# Patient Record
Sex: FEMALE | Race: WHITE | NOT HISPANIC OR LATINO | Employment: PART TIME | URBAN - METROPOLITAN AREA
[De-identification: names, ages, dates, MRNs, and addresses within clinical notes are randomized per-mention and may not be internally consistent; named-entity substitution may affect disease eponyms.]

---

## 2023-03-27 ENCOUNTER — LAB VISIT (OUTPATIENT)
Dept: LAB | Facility: HOSPITAL | Age: 28
End: 2023-03-27
Attending: OBSTETRICS & GYNECOLOGY
Payer: COMMERCIAL

## 2023-03-27 ENCOUNTER — ANESTHESIA EVENT (OUTPATIENT)
Dept: SURGERY | Facility: HOSPITAL | Age: 28
End: 2023-03-27
Payer: COMMERCIAL

## 2023-03-27 DIAGNOSIS — Z01.818 OTHER SPECIFIED PRE-OPERATIVE EXAMINATION: Primary | ICD-10-CM

## 2023-03-27 DIAGNOSIS — Z01.818 OTHER SPECIFIED PRE-OPERATIVE EXAMINATION: ICD-10-CM

## 2023-03-27 LAB
BASOPHILS # BLD AUTO: 0.07 X10(3)/MCL (ref 0–0.2)
BASOPHILS NFR BLD AUTO: 1.3 %
EOSINOPHIL # BLD AUTO: 0.1 X10(3)/MCL (ref 0–0.9)
EOSINOPHIL NFR BLD AUTO: 1.8 %
ERYTHROCYTE [DISTWIDTH] IN BLOOD BY AUTOMATED COUNT: 12.2 % (ref 11.5–17)
HCT VFR BLD AUTO: 39.8 % (ref 37–47)
HGB BLD-MCNC: 12.9 G/DL (ref 12–16)
IMM GRANULOCYTES # BLD AUTO: 0.01 X10(3)/MCL (ref 0–0.04)
IMM GRANULOCYTES NFR BLD AUTO: 0.2 %
LYMPHOCYTES # BLD AUTO: 1.97 X10(3)/MCL (ref 0.6–4.6)
LYMPHOCYTES NFR BLD AUTO: 35.4 %
MCH RBC QN AUTO: 29.5 PG (ref 27–31)
MCHC RBC AUTO-ENTMCNC: 32.4 G/DL (ref 33–36)
MCV RBC AUTO: 90.9 FL (ref 80–94)
MONOCYTES # BLD AUTO: 0.61 X10(3)/MCL (ref 0.1–1.3)
MONOCYTES NFR BLD AUTO: 11 %
NEUTROPHILS # BLD AUTO: 2.81 X10(3)/MCL (ref 2.1–9.2)
NEUTROPHILS NFR BLD AUTO: 50.3 %
NRBC BLD AUTO-RTO: 0 %
PLATELET # BLD AUTO: 277 X10(3)/MCL (ref 130–400)
PMV BLD AUTO: 10.4 FL (ref 7.4–10.4)
RBC # BLD AUTO: 4.38 X10(6)/MCL (ref 4.2–5.4)
WBC # SPEC AUTO: 5.6 X10(3)/MCL (ref 4.5–11.5)

## 2023-03-27 PROCEDURE — 36415 COLL VENOUS BLD VENIPUNCTURE: CPT

## 2023-03-27 PROCEDURE — 85025 COMPLETE CBC W/AUTO DIFF WBC: CPT

## 2023-03-27 RX ORDER — LEVOTHYROXINE SODIUM 100 UG/1
150 TABLET ORAL DAILY
COMMUNITY

## 2023-03-27 NOTE — ANESTHESIA PREPROCEDURE EVALUATION
03/27/2023  Carina Tamayo is a 27 y.o., female presents as an outpatient for D&C (missed AB).  Seven weeks EGA    Last 3 sets of Vitals    Vitals - 1 value per visit 3/27/2023 3/28/2023   SYSTOLIC - 112   DIASTOLIC - 77   Pulse - 83   Temp - 98   Resp - 16   SPO2 - 100   Weight (lb) 134 -   Weight (kg) 60.782 -   Height 63 -   BMI (Calculated) 23.7 -         Lab Results   Component Value Date    WBC 5.6 03/27/2023    HGB 12.9 03/27/2023    HCT 39.8 03/27/2023    MCV 90.9 03/27/2023     03/27/2023   Pre-op Assessment    I have reviewed the Patient Summary Reports.    I have reviewed the NPO Status.   I have reviewed the Medications.     Review of Systems  Anesthesia Hx:   Denies Personal Hx of Anesthesia complications.   Social:  Non-Smoker    Cardiovascular:  Functional Capacity good / => 4 METS        Physical Exam  General: Well nourished, Cooperative, Alert and Oriented    Airway:  Mallampati: II   Mouth Opening: Normal  TM Distance: Normal  Tongue: Normal  Neck ROM: Normal ROM    Dental:  Intact    Chest/Lungs:  Clear to auscultation, Normal Respiratory Rate    Heart:  Rate: Normal  Rhythm: Regular Rhythm        Anesthesia Plan  Type of Anesthesia, risks & benefits discussed:    Anesthesia Type: Gen Supraglottic Airway  Intra-op Monitoring Plan: Standard ASA Monitors  Post Op Pain Control Plan: multimodal analgesia and IV/PO Opioids PRN  Induction:  IV  Airway Plan: Direct  Informed Consent: Informed consent signed with the Patient and all parties understand the risks and agree with anesthesia plan.  All questions answered.   ASA Score: 1  Day of Surgery Review of History & Physical: H&P Update referred to the surgeon/provider.    Ready For Surgery From Anesthesia Perspective.     .

## 2023-03-28 ENCOUNTER — HOSPITAL ENCOUNTER (OUTPATIENT)
Facility: HOSPITAL | Age: 28
Discharge: HOME | End: 2023-03-28
Attending: OBSTETRICS & GYNECOLOGY | Admitting: OBSTETRICS & GYNECOLOGY
Payer: COMMERCIAL

## 2023-03-28 ENCOUNTER — ANESTHESIA (OUTPATIENT)
Dept: SURGERY | Facility: HOSPITAL | Age: 28
End: 2023-03-28
Payer: COMMERCIAL

## 2023-03-28 DIAGNOSIS — O02.1 MISSED ABORTION: ICD-10-CM

## 2023-03-28 LAB
GROUP & RH: NORMAL
INDIRECT COOMBS GEL: NORMAL
SPECIMEN OUTDATE: NORMAL

## 2023-03-28 PROCEDURE — 71000016 HC POSTOP RECOV ADDL HR: Performed by: OBSTETRICS & GYNECOLOGY

## 2023-03-28 PROCEDURE — 25000003 PHARM REV CODE 250: Performed by: ANESTHESIOLOGY

## 2023-03-28 PROCEDURE — 25000003 PHARM REV CODE 250

## 2023-03-28 PROCEDURE — 63600175 PHARM REV CODE 636 W HCPCS

## 2023-03-28 PROCEDURE — 37000008 HC ANESTHESIA 1ST 15 MINUTES: Performed by: OBSTETRICS & GYNECOLOGY

## 2023-03-28 PROCEDURE — 63600175 PHARM REV CODE 636 W HCPCS: Performed by: ANESTHESIOLOGY

## 2023-03-28 PROCEDURE — 86900 BLOOD TYPING SEROLOGIC ABO: CPT | Performed by: OBSTETRICS & GYNECOLOGY

## 2023-03-28 PROCEDURE — 36000704 HC OR TIME LEV I 1ST 15 MIN: Performed by: OBSTETRICS & GYNECOLOGY

## 2023-03-28 PROCEDURE — 36000705 HC OR TIME LEV I EA ADD 15 MIN: Performed by: OBSTETRICS & GYNECOLOGY

## 2023-03-28 PROCEDURE — 25000003 PHARM REV CODE 250: Performed by: OBSTETRICS & GYNECOLOGY

## 2023-03-28 PROCEDURE — 71000015 HC POSTOP RECOV 1ST HR: Performed by: OBSTETRICS & GYNECOLOGY

## 2023-03-28 PROCEDURE — 63700000 PHARM REV CODE 250 ALT 637 W/O HCPCS: Performed by: OBSTETRICS & GYNECOLOGY

## 2023-03-28 PROCEDURE — 37000009 HC ANESTHESIA EA ADD 15 MINS: Performed by: OBSTETRICS & GYNECOLOGY

## 2023-03-28 PROCEDURE — 71000033 HC RECOVERY, INTIAL HOUR: Performed by: OBSTETRICS & GYNECOLOGY

## 2023-03-28 RX ORDER — MEPERIDINE HYDROCHLORIDE 25 MG/ML
12.5 INJECTION INTRAMUSCULAR; INTRAVENOUS; SUBCUTANEOUS EVERY 10 MIN PRN
Status: DISCONTINUED | OUTPATIENT
Start: 2023-03-28 | End: 2023-03-28

## 2023-03-28 RX ORDER — LORAZEPAM 2 MG/ML
0.25 INJECTION INTRAMUSCULAR ONCE AS NEEDED
Status: DISCONTINUED | OUTPATIENT
Start: 2023-03-28 | End: 2023-03-28

## 2023-03-28 RX ORDER — MIDAZOLAM HYDROCHLORIDE 1 MG/ML
2 INJECTION INTRAMUSCULAR; INTRAVENOUS ONCE AS NEEDED
Status: CANCELLED | OUTPATIENT
Start: 2023-03-28 | End: 2034-08-23

## 2023-03-28 RX ORDER — FENTANYL CITRATE 50 UG/ML
INJECTION, SOLUTION INTRAMUSCULAR; INTRAVENOUS
Status: DISCONTINUED | OUTPATIENT
Start: 2023-03-28 | End: 2023-03-28

## 2023-03-28 RX ORDER — MORPHINE SULFATE 4 MG/ML
3 INJECTION, SOLUTION INTRAMUSCULAR; INTRAVENOUS
Status: DISCONTINUED | OUTPATIENT
Start: 2023-03-28 | End: 2023-03-28 | Stop reason: HOSPADM

## 2023-03-28 RX ORDER — HYDROMORPHONE HYDROCHLORIDE 2 MG/ML
0.2 INJECTION, SOLUTION INTRAMUSCULAR; INTRAVENOUS; SUBCUTANEOUS EVERY 5 MIN PRN
Status: DISCONTINUED | OUTPATIENT
Start: 2023-03-28 | End: 2023-03-28

## 2023-03-28 RX ORDER — ACETAMINOPHEN 10 MG/ML
1000 INJECTION, SOLUTION INTRAVENOUS ONCE
Status: COMPLETED | OUTPATIENT
Start: 2023-03-28 | End: 2023-03-28

## 2023-03-28 RX ORDER — PROCHLORPERAZINE EDISYLATE 5 MG/ML
5 INJECTION INTRAMUSCULAR; INTRAVENOUS EVERY 6 HOURS PRN
Status: DISCONTINUED | OUTPATIENT
Start: 2023-03-28 | End: 2023-03-28 | Stop reason: HOSPADM

## 2023-03-28 RX ORDER — ONDANSETRON HYDROCHLORIDE 2 MG/ML
INJECTION, SOLUTION INTRAMUSCULAR; INTRAVENOUS
Status: DISCONTINUED | OUTPATIENT
Start: 2023-03-28 | End: 2023-03-28

## 2023-03-28 RX ORDER — PROCHLORPERAZINE EDISYLATE 5 MG/ML
5 INJECTION INTRAMUSCULAR; INTRAVENOUS EVERY 30 MIN PRN
Status: DISCONTINUED | OUTPATIENT
Start: 2023-03-28 | End: 2023-03-28

## 2023-03-28 RX ORDER — METRONIDAZOLE 500 MG/1
1000 TABLET ORAL ONCE
Status: COMPLETED | OUTPATIENT
Start: 2023-03-28 | End: 2023-03-28

## 2023-03-28 RX ORDER — SCOLOPAMINE TRANSDERMAL SYSTEM 1 MG/1
PATCH, EXTENDED RELEASE TRANSDERMAL
Status: COMPLETED
Start: 2023-03-28 | End: 2023-03-28

## 2023-03-28 RX ORDER — HYDROCODONE BITARTRATE AND ACETAMINOPHEN 5; 325 MG/1; MG/1
1 TABLET ORAL EVERY 4 HOURS PRN
Status: DISCONTINUED | OUTPATIENT
Start: 2023-03-28 | End: 2023-03-28 | Stop reason: HOSPADM

## 2023-03-28 RX ORDER — CELECOXIB 200 MG/1
200 CAPSULE ORAL ONCE
Status: COMPLETED | OUTPATIENT
Start: 2023-03-28 | End: 2023-03-28

## 2023-03-28 RX ORDER — IPRATROPIUM BROMIDE AND ALBUTEROL SULFATE 2.5; .5 MG/3ML; MG/3ML
3 SOLUTION RESPIRATORY (INHALATION)
Status: DISCONTINUED | OUTPATIENT
Start: 2023-03-28 | End: 2023-03-28

## 2023-03-28 RX ORDER — DEXMEDETOMIDINE HYDROCHLORIDE 100 UG/ML
INJECTION, SOLUTION INTRAVENOUS
Status: DISCONTINUED | OUTPATIENT
Start: 2023-03-28 | End: 2023-03-28

## 2023-03-28 RX ORDER — PROPOFOL 10 MG/ML
VIAL (ML) INTRAVENOUS
Status: DISCONTINUED | OUTPATIENT
Start: 2023-03-28 | End: 2023-03-28

## 2023-03-28 RX ORDER — IBUPROFEN 600 MG/1
600 TABLET ORAL EVERY 6 HOURS PRN
Status: DISCONTINUED | OUTPATIENT
Start: 2023-03-28 | End: 2023-03-28 | Stop reason: HOSPADM

## 2023-03-28 RX ORDER — ONDANSETRON 4 MG/1
8 TABLET, ORALLY DISINTEGRATING ORAL EVERY 6 HOURS PRN
Status: CANCELLED | OUTPATIENT
Start: 2023-03-28

## 2023-03-28 RX ORDER — DIPHENHYDRAMINE HYDROCHLORIDE 50 MG/ML
25 INJECTION INTRAMUSCULAR; INTRAVENOUS EVERY 4 HOURS PRN
Status: DISCONTINUED | OUTPATIENT
Start: 2023-03-28 | End: 2023-03-28 | Stop reason: HOSPADM

## 2023-03-28 RX ORDER — SODIUM CHLORIDE, SODIUM GLUCONATE, SODIUM ACETATE, POTASSIUM CHLORIDE AND MAGNESIUM CHLORIDE 30; 37; 368; 526; 502 MG/100ML; MG/100ML; MG/100ML; MG/100ML; MG/100ML
INJECTION, SOLUTION INTRAVENOUS CONTINUOUS
Status: CANCELLED | OUTPATIENT
Start: 2023-03-28 | End: 2023-04-27

## 2023-03-28 RX ORDER — DEXAMETHASONE SODIUM PHOSPHATE 4 MG/ML
INJECTION, SOLUTION INTRA-ARTICULAR; INTRALESIONAL; INTRAMUSCULAR; INTRAVENOUS; SOFT TISSUE
Status: DISCONTINUED | OUTPATIENT
Start: 2023-03-28 | End: 2023-03-28

## 2023-03-28 RX ORDER — ONDANSETRON 4 MG/1
8 TABLET, ORALLY DISINTEGRATING ORAL EVERY 8 HOURS PRN
Status: DISCONTINUED | OUTPATIENT
Start: 2023-03-28 | End: 2023-03-28 | Stop reason: HOSPADM

## 2023-03-28 RX ORDER — GABAPENTIN 300 MG/1
600 CAPSULE ORAL ONCE
Status: COMPLETED | OUTPATIENT
Start: 2023-03-28 | End: 2023-03-28

## 2023-03-28 RX ORDER — SODIUM CHLORIDE, SODIUM LACTATE, POTASSIUM CHLORIDE, CALCIUM CHLORIDE 600; 310; 30; 20 MG/100ML; MG/100ML; MG/100ML; MG/100ML
INJECTION, SOLUTION INTRAVENOUS CONTINUOUS
Status: DISCONTINUED | OUTPATIENT
Start: 2023-03-28 | End: 2023-03-28

## 2023-03-28 RX ORDER — LIDOCAINE HYDROCHLORIDE 10 MG/ML
INJECTION, SOLUTION EPIDURAL; INFILTRATION; INTRACAUDAL; PERINEURAL
Status: DISCONTINUED | OUTPATIENT
Start: 2023-03-28 | End: 2023-03-28

## 2023-03-28 RX ORDER — HYDROMORPHONE HYDROCHLORIDE 2 MG/ML
0.4 INJECTION, SOLUTION INTRAMUSCULAR; INTRAVENOUS; SUBCUTANEOUS EVERY 5 MIN PRN
Status: DISCONTINUED | OUTPATIENT
Start: 2023-03-28 | End: 2023-03-28

## 2023-03-28 RX ORDER — LIDOCAINE HYDROCHLORIDE 10 MG/ML
1 INJECTION, SOLUTION EPIDURAL; INFILTRATION; INTRACAUDAL; PERINEURAL ONCE
Status: CANCELLED | OUTPATIENT
Start: 2023-03-28 | End: 2023-03-28

## 2023-03-28 RX ORDER — ONDANSETRON 2 MG/ML
4 INJECTION INTRAMUSCULAR; INTRAVENOUS DAILY PRN
Status: DISCONTINUED | OUTPATIENT
Start: 2023-03-28 | End: 2023-03-28

## 2023-03-28 RX ORDER — DIPHENHYDRAMINE HCL 25 MG
25 CAPSULE ORAL EVERY 4 HOURS PRN
Status: DISCONTINUED | OUTPATIENT
Start: 2023-03-28 | End: 2023-03-28 | Stop reason: HOSPADM

## 2023-03-28 RX ORDER — MIDAZOLAM HYDROCHLORIDE 1 MG/ML
INJECTION INTRAMUSCULAR; INTRAVENOUS
Status: COMPLETED
Start: 2023-03-28 | End: 2023-03-28

## 2023-03-28 RX ORDER — AZITHROMYCIN 250 MG/1
500 TABLET, FILM COATED ORAL ONCE
Status: COMPLETED | OUTPATIENT
Start: 2023-03-28 | End: 2023-03-28

## 2023-03-28 RX ADMIN — DEXAMETHASONE SODIUM PHOSPHATE 8 MG: 4 INJECTION, SOLUTION INTRA-ARTICULAR; INTRALESIONAL; INTRAMUSCULAR; INTRAVENOUS; SOFT TISSUE at 07:03

## 2023-03-28 RX ADMIN — SODIUM CHLORIDE, POTASSIUM CHLORIDE, SODIUM LACTATE AND CALCIUM CHLORIDE: 600; 310; 30; 20 INJECTION, SOLUTION INTRAVENOUS at 06:03

## 2023-03-28 RX ADMIN — SODIUM CHLORIDE, POTASSIUM CHLORIDE, SODIUM LACTATE AND CALCIUM CHLORIDE: 600; 310; 30; 20 INJECTION, SOLUTION INTRAVENOUS at 08:03

## 2023-03-28 RX ADMIN — ONDANSETRON 4 MG: 2 INJECTION INTRAMUSCULAR; INTRAVENOUS at 07:03

## 2023-03-28 RX ADMIN — CELECOXIB 200 MG: 200 CAPSULE ORAL at 06:03

## 2023-03-28 RX ADMIN — LIDOCAINE HYDROCHLORIDE 50 MG: 10 INJECTION, SOLUTION EPIDURAL; INFILTRATION; INTRACAUDAL; PERINEURAL at 07:03

## 2023-03-28 RX ADMIN — SCOPOLAMINE 1 PATCH: 1 PATCH TRANSDERMAL at 06:03

## 2023-03-28 RX ADMIN — PROPOFOL 150 MG: 10 INJECTION, EMULSION INTRAVENOUS at 07:03

## 2023-03-28 RX ADMIN — FENTANYL CITRATE 50 MCG: 50 INJECTION, SOLUTION INTRAMUSCULAR; INTRAVENOUS at 07:03

## 2023-03-28 RX ADMIN — GABAPENTIN 600 MG: 300 CAPSULE ORAL at 06:03

## 2023-03-28 RX ADMIN — FENTANYL CITRATE 25 MCG: 50 INJECTION, SOLUTION INTRAMUSCULAR; INTRAVENOUS at 07:03

## 2023-03-28 RX ADMIN — ACETAMINOPHEN 1000 MG: 10 INJECTION, SOLUTION INTRAVENOUS at 06:03

## 2023-03-28 RX ADMIN — AZITHROMYCIN MONOHYDRATE 500 MG: 250 TABLET ORAL at 07:03

## 2023-03-28 RX ADMIN — MIDAZOLAM HYDROCHLORIDE 2 MG: 1 INJECTION, SOLUTION INTRAMUSCULAR; INTRAVENOUS at 07:03

## 2023-03-28 RX ADMIN — DEXMEDETOMIDINE 6 MCG: 200 INJECTION, SOLUTION INTRAVENOUS at 07:03

## 2023-03-28 RX ADMIN — METRONIDAZOLE 1000 MG: 500 TABLET ORAL at 07:03

## 2023-03-28 NOTE — TRANSFER OF CARE
Anesthesia Transfer of Care Note    Patient: Carina Tamayo    Procedure(s) Performed: Procedure(s) (LRB):  DILATION AND CURETTAGE, UTERUS, USING SUCTION (N/A)    Patient location: PACU    Anesthesia Type: general    Transport from OR: Transported from OR on room air with adequate spontaneous ventilation    Post pain: adequate analgesia    Post assessment: no apparent anesthetic complications and tolerated procedure well    Post vital signs: stable    Level of consciousness: responds to stimulation    Nausea/Vomiting: no nausea/vomiting    Complications: none    Transfer of care protocol was followed      Last vitals:

## 2023-03-28 NOTE — OP NOTE
GYN OPERATIVE REPORT    Date: 3/28/23    Preop Diagnosis: Missed     Postop Diagnosis: Same    Procedure: Suction D&C    Surgeon: Rebekah العراقي MD    Anesthesia: General     IVF: 400ml    EBL: 200ml    Urine output: 300ml    Specimen:Products of conception    Complications:None    Findings:  Exam revealed closed, nulliparous cervix.  Uterus 9 week size mobile uterus that sounded to 10cm.     Procedure: The patient was taken to the OR, general anesthesia was obtained without difficulty.  She was placed in the dorsal lithotomy position with Otoniel stirrups.  Exam under anesthesia revealed the above mentioned findings.  She was then prepped and draped in the normal sterile fashion.  A speculum was placed in the vagina.  A single tooth tenaculum was used to grasp the anterior lip of the cervix. Hegar dilators were used to sequentially dilate the cervix to accommodate an 10 mm then 8 mm suction curet. Suction curet was advanced to fundus and suction activated with POC noted in tubing. The uterus was curetted in a clockwise fashion until a gritty feeling was noted and suction curettage was again performed. The specimen was sent to pathology.  The tenaculum was removed from the cervix and the puncture site was noted to be hemostatic.  No active bleeding was noted.  The patient tolerated the procedure well.  All instrument and sponge counts were correct times two.  The patient was awakened from general anesthesia and taken to the recovery room in stable condition.

## 2023-03-28 NOTE — H&P
Pre-Operative History and Physical   Obstetrics and Gynecology  Date: 3/28/23    Carina Tamayo is a 27 y.o. female  for preop examination.  She is scheduled for a suction d&c for missed AB at 8 wga on 3/28/23.    ROS:  GENERAL: Denies weight gain or weight loss. Feeling well overall.   SKIN: Denies rash or lesions.   HEAD: Denies head injury or headache.   NODES: Denies enlarged lymph nodes.   CHEST: Denies chest pain or shortness of breath.   CARDIOVASCULAR: Denies palpitations or left sided chest pain.   ABDOMEN: No abdominal pain, constipation, diarrhea, nausea, vomiting or rectal bleeding.   URINARY: No frequency, dysuria, hematuria, or burning on urination.  REPRODUCTIVE: See HPI.   BREASTS: denies pain, lumps, or nipple discharge.   HEMATOLOGIC: No easy bruisability or excessive bleeding with the exception of menstrual cycles.  MUSCULOSKELETAL: Denies joint pain or swelling.   NEUROLOGIC: Denies syncope or weakness.   PSYCHIATRIC: Denies depression, anxiety or mood swings.    Past Medical History:   Diagnosis Date    Disorder of thyroid, unspecified     Missed       Past Surgical History:   Procedure Laterality Date    THYROIDECTOMY      TUMOR EXCISION      removed from thyroid     Family History   Problem Relation Age of Onset    No Known Problems Mother     No Known Problems Father      Review of patient's allergies indicates:   Allergen Reactions    Doxycycline Rash       Current Facility-Administered Medications:     albuterol-ipratropium 2.5 mg-0.5 mg/3 mL nebulizer solution 3 mL, 3 mL, Nebulization, Q1H PRN, Bentley Warren Jr., MD    HYDROmorphone (PF) injection 0.2 mg, 0.2 mg, Intravenous, Q5 Min PRN, Bentley Warren Jr., MD    HYDROmorphone (PF) injection 0.4 mg, 0.4 mg, Intravenous, Q5 Min PRN, Bentley Warren Jr., MD    lactated ringers infusion, , Intravenous, Continuous, Bentley Warren Jr., MD, Stopped at 23 0742    LORazepam injection 0.25 mg, 0.25 mg, Intravenous, Once PRN,  Bentley Warren Jr., MD    meperidine (PF) injection 12.5 mg, 12.5 mg, Intravenous, Q10 Min PRN, Bentley Warren Jr., MD    ondansetron injection 4 mg, 4 mg, Intravenous, Daily PRN, Bentley Warren Jr., MD    prochlorperazine injection Soln 5 mg, 5 mg, Intravenous, Q30 Min PRN, Bentley Warren Jr., MD    Facility-Administered Medications Ordered in Other Encounters:     dexAMETHasone injection, , Intravenous, PRN, Domonique M Neatherlin, CRNA, 8 mg at 03/28/23 0725    dexmedeTOMIDine injection, , Intravenous, PRN, Domonique M Neatherlin, CRNA, 6 mcg at 03/28/23 0735    fentaNYL injection, , Intravenous, PRN, Domonique M Neatherlin, CRNA, 50 mcg at 03/28/23 0735    LIDOcaine (PF) 10 mg/ml (1%) injection, , Intravenous, PRN, Domonique M Neatherlin, CRNA, 50 mg at 03/28/23 0718    ondansetron HCl (PF) injection, , Intravenous, PRN, Domonique M Neatherlin, CRNA, 4 mg at 03/28/23 0725    propofol (DIPRIVAN) 10 mg/mL infusion, , Intravenous, PRN, Domonique M Neatherlin, CRNA, 150 mg at 03/28/23 0718  Outpatient Medications Marked as Taking for the 3/28/23 encounter (Hospital Encounter)   Medication Sig Dispense Refill    levothyroxine (SYNTHROID) 100 MCG tablet Take 0.175 mcg by mouth before breakfast.       Social History     Tobacco Use    Smoking status: Never    Smokeless tobacco: Never   Substance Use Topics    Alcohol use: Not Currently    Drug use: Never         Vitals:    03/28/23 0543   BP: 112/77   Pulse: 83   Resp: 16   Temp: 98 °F (36.7 °C)     General Appearance: no acute distress, alert and oriented   Cardiovascular Exam: regular rate and rhythm  Gastrointestinal Exam: clear to auscultation bilaterally    Rh+    Assessment: Missed AB    Plan:   To OR for suction d&c  Flagyl and Azithro due to Doxy allergy  I have discussed the risks, benefits, indications, and alternatives of the procedure in detail.  The patient verbalizes her understanding.  All questions answered.  Consents signed.  The patient agrees to proceed to proceed as  planned.

## 2023-03-28 NOTE — DISCHARGE SUMMARY
[facility] Surgical - Periop Services  Discharge Summary  OBGYN    Admission date: 3/28/2023  Discharge date: 2023    Admit Dx:      Patient Active Problem List   Diagnosis    Missed      Discharge Dx:    Patient Active Problem List   Diagnosis    Missed        Attending Physician: Rebekah العراقي   Discharge Provider: Rebekah العراقي    Procedures Performed: Procedure(s) (LRB):  DILATION AND CURETTAGE, UTERUS, USING SUCTION (N/A)    Hospital Course: Patient was admitted on 3/28/2023 to undergo Suction D&C secondary to missed .  Procedure was uncomplicated, for full details see operative report. Barring any unforseen incidents, patient will be discharged home when she is able to ambulate, void, and tolerate PO intake.    Consults: none    Significant Diagnostic Studies:   Lab Results   Component Value Date    WBC 5.6 2023    HGB 12.9 2023    HCT 39.8 2023    MCV 90.9 2023     2023     No image results found.  Rh+    Discharged Condition: stable    Disposition: Home    Follow Up: scheduled      Medications:  Current Discharge Medication List        CONTINUE these medications which have NOT CHANGED    Details   levothyroxine (SYNTHROID) 100 MCG tablet Take 0.175 mcg by mouth before breakfast.             No discharge procedures on file.

## 2023-03-28 NOTE — PLAN OF CARE
VSS, radha score  10 , pt arousable and ready for transfer to [facility] per Dr Warren.

## 2023-03-28 NOTE — ANESTHESIA POSTPROCEDURE EVALUATION
Anesthesia Post Evaluation    Patient: Carina Tamayo    Procedure(s) Performed: Procedure(s) (LRB):  DILATION AND CURETTAGE, UTERUS, USING SUCTION (N/A)    Final Anesthesia Type: general      Patient location during evaluation: floor  Patient participation: Yes- Able to Participate  Level of consciousness: awake and alert  Post-procedure vital signs: reviewed and stable  Pain management: adequate  Airway patency: patent    PONV status at discharge: No PONV  Anesthetic complications: no      Cardiovascular status: blood pressure returned to baseline  Respiratory status: spontaneous ventilation and room air  Hydration status: euvolemic  Follow-up not needed.          Vitals Value Taken Time   /71 03/28/23 0901   Temp 36.1 °C (97 °F) 03/28/23 0830   Pulse 62 03/28/23 0901   Resp 18 03/28/23 0830   SpO2 100 % 03/28/23 0901         Event Time   Out of Recovery 08:27:00         Pain/Rachele Score: Pain Rating Prior to Med Admin: 0 (3/28/2023  6:04 AM)  Rachele Score: 10 (3/28/2023  8:30 AM)

## 2023-03-28 NOTE — ANESTHESIA PROCEDURE NOTES
Intubation    Date/Time: 3/28/2023 7:19 AM  Performed by: Domonique Villar CRNA  Authorized by: Bentley Warren Jr., MD     Intubation:     Induction:  Intravenous    Intubated:  Postinduction    Mask Ventilation:  Easy mask    Attempts:  1    Attempted By:  CRNA    Difficult Airway Encountered?: No      Airway Device:  Supraglottic airway/LMA    Airway Device Size:  3.0    Style/Cuff Inflation:  Cuffed (inflated to minimal occlusive pressure)    Secured at:  The lips    Placement Verified By:  Capnometry    Complicating Factors:  None    Findings Post-Intubation:  BS equal bilateral

## 2023-03-29 VITALS
WEIGHT: 134 LBS | HEIGHT: 63 IN | RESPIRATION RATE: 18 BRPM | BODY MASS INDEX: 23.74 KG/M2 | OXYGEN SATURATION: 100 % | TEMPERATURE: 97 F | DIASTOLIC BLOOD PRESSURE: 71 MMHG | SYSTOLIC BLOOD PRESSURE: 103 MMHG | HEART RATE: 62 BPM

## 2023-03-29 LAB — PSYCHE PATHOLOGY RESULT: NORMAL

## 2023-08-07 ENCOUNTER — LAB VISIT (OUTPATIENT)
Dept: LAB | Facility: HOSPITAL | Age: 28
End: 2023-08-07
Attending: OBSTETRICS & GYNECOLOGY
Payer: COMMERCIAL

## 2023-08-07 DIAGNOSIS — Z01.818 OTHER SPECIFIED PRE-OPERATIVE EXAMINATION: Primary | ICD-10-CM

## 2023-08-07 DIAGNOSIS — Z01.818 OTHER SPECIFIED PRE-OPERATIVE EXAMINATION: ICD-10-CM

## 2023-08-07 LAB
BASOPHILS # BLD AUTO: 0.07 X10(3)/MCL
BASOPHILS NFR BLD AUTO: 1.3 %
EOSINOPHIL # BLD AUTO: 0.27 X10(3)/MCL (ref 0–0.9)
EOSINOPHIL NFR BLD AUTO: 5.2 %
ERYTHROCYTE [DISTWIDTH] IN BLOOD BY AUTOMATED COUNT: 12.6 % (ref 11.5–17)
HCT VFR BLD AUTO: 39.8 % (ref 37–47)
HGB BLD-MCNC: 12.8 G/DL (ref 12–16)
IMM GRANULOCYTES # BLD AUTO: 0.01 X10(3)/MCL (ref 0–0.04)
IMM GRANULOCYTES NFR BLD AUTO: 0.2 %
LYMPHOCYTES # BLD AUTO: 1.86 X10(3)/MCL (ref 0.6–4.6)
LYMPHOCYTES NFR BLD AUTO: 35.6 %
MCH RBC QN AUTO: 29.1 PG (ref 27–31)
MCHC RBC AUTO-ENTMCNC: 32.2 G/DL (ref 33–36)
MCV RBC AUTO: 90.5 FL (ref 80–94)
MONOCYTES # BLD AUTO: 0.6 X10(3)/MCL (ref 0.1–1.3)
MONOCYTES NFR BLD AUTO: 11.5 %
NEUTROPHILS # BLD AUTO: 2.41 X10(3)/MCL (ref 2.1–9.2)
NEUTROPHILS NFR BLD AUTO: 46.2 %
NRBC BLD AUTO-RTO: 0 %
PLATELET # BLD AUTO: 295 X10(3)/MCL (ref 130–400)
PMV BLD AUTO: 11.5 FL (ref 7.4–10.4)
RBC # BLD AUTO: 4.4 X10(6)/MCL (ref 4.2–5.4)
WBC # SPEC AUTO: 5.22 X10(3)/MCL (ref 4.5–11.5)

## 2023-08-07 PROCEDURE — 36415 COLL VENOUS BLD VENIPUNCTURE: CPT

## 2023-08-07 PROCEDURE — 85025 COMPLETE CBC W/AUTO DIFF WBC: CPT

## 2023-08-07 RX ORDER — LIDOCAINE HYDROCHLORIDE 10 MG/ML
1 INJECTION, SOLUTION EPIDURAL; INFILTRATION; INTRACAUDAL; PERINEURAL ONCE AS NEEDED
Status: CANCELLED | OUTPATIENT
Start: 2023-08-07 | End: 2035-01-03

## 2023-08-07 RX ORDER — SODIUM CHLORIDE 9 MG/ML
INJECTION, SOLUTION INTRAVENOUS CONTINUOUS
Status: CANCELLED | OUTPATIENT
Start: 2023-08-08

## 2023-08-07 NOTE — DISCHARGE INSTRUCTIONS
Patient Education       Discharge Instructions     What care is needed at home?   It is normal to have vaginal bleeding for a few weeks. You may use sanitary pads, but not tampons.  Do not douche, use tampons, or have sexual contact for 2 weeks or until release by your doctor.  You may shower in 24 hours.  No tub baths, swimming, or use a hot tub until release by your doctor.  Ask your doctor about when it is safe for you to go back to your normal activities like work, driving, and sex.  Take your medications as ordered.  What follow-up care is needed?   Be sure to keep your follow-up visit.  Will physical activity be limited?   Rest for the first few days after the procedure. Avoid strenuous activities like heavy lifting and hard workouts.  What problems could happen?   Harm to the cervix  Scarring of the lining of the uterus  Infection  Bleeding  A hole in the uterus from the tools used during the procedure  When do I need to call the doctor?   Signs of infection like a fever of 100.4°F (38°C) or higher, chills, pain with passing urine, or bad smelling drainage from the vagina.  Very bad belly pain  Heavy bleeding (more than 2 pads an hour or heavier than your normal cycle)  Upset stomach and throwing up  You are not feeling better in 2 to 3 days or you are feeling worse

## 2023-08-08 ENCOUNTER — ANESTHESIA EVENT (OUTPATIENT)
Dept: SURGERY | Facility: HOSPITAL | Age: 28
End: 2023-08-08
Payer: COMMERCIAL

## 2023-08-08 ENCOUNTER — HOSPITAL ENCOUNTER (OUTPATIENT)
Facility: HOSPITAL | Age: 28
Discharge: HOME | End: 2023-08-08
Attending: OBSTETRICS & GYNECOLOGY | Admitting: OBSTETRICS & GYNECOLOGY
Payer: COMMERCIAL

## 2023-08-08 ENCOUNTER — ANESTHESIA (OUTPATIENT)
Dept: SURGERY | Facility: HOSPITAL | Age: 28
End: 2023-08-08
Payer: COMMERCIAL

## 2023-08-08 DIAGNOSIS — N96 HABITUAL ABORTER: ICD-10-CM

## 2023-08-08 LAB
B-HCG UR QL: NEGATIVE
CTP QC/QA: YES

## 2023-08-08 PROCEDURE — D9220A PRA ANESTHESIA: Mod: ANES,,, | Performed by: ANESTHESIOLOGY

## 2023-08-08 PROCEDURE — 81025 URINE PREGNANCY TEST: CPT | Performed by: OBSTETRICS & GYNECOLOGY

## 2023-08-08 PROCEDURE — 63600175 PHARM REV CODE 636 W HCPCS: Performed by: ANESTHESIOLOGY

## 2023-08-08 PROCEDURE — 37000009 HC ANESTHESIA EA ADD 15 MINS: Performed by: OBSTETRICS & GYNECOLOGY

## 2023-08-08 PROCEDURE — 37000008 HC ANESTHESIA 1ST 15 MINUTES: Performed by: OBSTETRICS & GYNECOLOGY

## 2023-08-08 PROCEDURE — 63600175 PHARM REV CODE 636 W HCPCS: Performed by: NURSE ANESTHETIST, CERTIFIED REGISTERED

## 2023-08-08 PROCEDURE — D9220A PRA ANESTHESIA: ICD-10-PCS | Mod: CRNA,,, | Performed by: NURSE ANESTHETIST, CERTIFIED REGISTERED

## 2023-08-08 PROCEDURE — 36000707: Performed by: OBSTETRICS & GYNECOLOGY

## 2023-08-08 PROCEDURE — 71000015 HC POSTOP RECOV 1ST HR: Performed by: OBSTETRICS & GYNECOLOGY

## 2023-08-08 PROCEDURE — 25000003 PHARM REV CODE 250: Performed by: NURSE ANESTHETIST, CERTIFIED REGISTERED

## 2023-08-08 PROCEDURE — 25000003 PHARM REV CODE 250

## 2023-08-08 PROCEDURE — 63600175 PHARM REV CODE 636 W HCPCS: Performed by: OBSTETRICS & GYNECOLOGY

## 2023-08-08 PROCEDURE — 63600175 PHARM REV CODE 636 W HCPCS

## 2023-08-08 PROCEDURE — 36000706: Performed by: OBSTETRICS & GYNECOLOGY

## 2023-08-08 PROCEDURE — 71000033 HC RECOVERY, INTIAL HOUR: Performed by: OBSTETRICS & GYNECOLOGY

## 2023-08-08 PROCEDURE — 71000016 HC POSTOP RECOV ADDL HR: Performed by: OBSTETRICS & GYNECOLOGY

## 2023-08-08 PROCEDURE — D9220A PRA ANESTHESIA: Mod: CRNA,,, | Performed by: NURSE ANESTHETIST, CERTIFIED REGISTERED

## 2023-08-08 PROCEDURE — D9220A PRA ANESTHESIA: ICD-10-PCS | Mod: ANES,,, | Performed by: ANESTHESIOLOGY

## 2023-08-08 RX ORDER — METOCLOPRAMIDE HYDROCHLORIDE 5 MG/ML
INJECTION INTRAMUSCULAR; INTRAVENOUS
Status: COMPLETED
Start: 2023-08-08 | End: 2023-08-08

## 2023-08-08 RX ORDER — SODIUM CHLORIDE 9 MG/ML
INJECTION, SOLUTION INTRAVENOUS CONTINUOUS
Status: CANCELLED | OUTPATIENT
Start: 2023-08-08

## 2023-08-08 RX ORDER — FENTANYL CITRATE 50 UG/ML
INJECTION, SOLUTION INTRAMUSCULAR; INTRAVENOUS
Status: DISCONTINUED | OUTPATIENT
Start: 2023-08-08 | End: 2023-08-08

## 2023-08-08 RX ORDER — METHOCARBAMOL 100 MG/ML
1000 INJECTION, SOLUTION INTRAMUSCULAR; INTRAVENOUS ONCE
Status: COMPLETED | OUTPATIENT
Start: 2023-08-08 | End: 2023-08-08

## 2023-08-08 RX ORDER — MORPHINE SULFATE 4 MG/ML
3 INJECTION, SOLUTION INTRAMUSCULAR; INTRAVENOUS
Status: DISCONTINUED | OUTPATIENT
Start: 2023-08-08 | End: 2023-08-08 | Stop reason: HOSPADM

## 2023-08-08 RX ORDER — FAMOTIDINE 10 MG/ML
INJECTION INTRAVENOUS
Status: COMPLETED
Start: 2023-08-08 | End: 2023-08-08

## 2023-08-08 RX ORDER — MIDAZOLAM HYDROCHLORIDE 1 MG/ML
INJECTION INTRAMUSCULAR; INTRAVENOUS
Status: COMPLETED
Start: 2023-08-08 | End: 2023-08-08

## 2023-08-08 RX ORDER — LIDOCAINE HYDROCHLORIDE 10 MG/ML
INJECTION, SOLUTION EPIDURAL; INFILTRATION; INTRACAUDAL; PERINEURAL
Status: DISCONTINUED | OUTPATIENT
Start: 2023-08-08 | End: 2023-08-08

## 2023-08-08 RX ORDER — DIPHENHYDRAMINE HYDROCHLORIDE 50 MG/ML
25 INJECTION INTRAMUSCULAR; INTRAVENOUS EVERY 4 HOURS PRN
Status: DISCONTINUED | OUTPATIENT
Start: 2023-08-08 | End: 2023-08-08 | Stop reason: HOSPADM

## 2023-08-08 RX ORDER — ACETAMINOPHEN 500 MG
1000 TABLET ORAL ONCE
Status: COMPLETED | OUTPATIENT
Start: 2023-08-08 | End: 2023-08-08

## 2023-08-08 RX ORDER — HYDROCODONE BITARTRATE AND ACETAMINOPHEN 5; 325 MG/1; MG/1
1 TABLET ORAL
Status: DISCONTINUED | OUTPATIENT
Start: 2023-08-08 | End: 2023-08-08

## 2023-08-08 RX ORDER — IPRATROPIUM BROMIDE AND ALBUTEROL SULFATE 2.5; .5 MG/3ML; MG/3ML
3 SOLUTION RESPIRATORY (INHALATION) ONCE AS NEEDED
Status: DISCONTINUED | OUTPATIENT
Start: 2023-08-08 | End: 2023-08-08

## 2023-08-08 RX ORDER — ACETAMINOPHEN 500 MG
TABLET ORAL
Status: COMPLETED
Start: 2023-08-08 | End: 2023-08-08

## 2023-08-08 RX ORDER — ONDANSETRON 2 MG/ML
4 INJECTION INTRAMUSCULAR; INTRAVENOUS ONCE
Status: COMPLETED | OUTPATIENT
Start: 2023-08-08 | End: 2023-08-08

## 2023-08-08 RX ORDER — SILVER NITRATE 38.21; 12.74 MG/1; MG/1
STICK TOPICAL
Status: DISCONTINUED
Start: 2023-08-08 | End: 2023-08-08 | Stop reason: WASHOUT

## 2023-08-08 RX ORDER — DIPHENHYDRAMINE HYDROCHLORIDE 50 MG/ML
25 INJECTION INTRAMUSCULAR; INTRAVENOUS EVERY 6 HOURS PRN
Status: DISCONTINUED | OUTPATIENT
Start: 2023-08-08 | End: 2023-08-08

## 2023-08-08 RX ORDER — METOCLOPRAMIDE HYDROCHLORIDE 5 MG/ML
10 INJECTION INTRAMUSCULAR; INTRAVENOUS EVERY 10 MIN PRN
Status: DISCONTINUED | OUTPATIENT
Start: 2023-08-08 | End: 2023-08-08

## 2023-08-08 RX ORDER — DEXAMETHASONE SODIUM PHOSPHATE 4 MG/ML
INJECTION, SOLUTION INTRA-ARTICULAR; INTRALESIONAL; INTRAMUSCULAR; INTRAVENOUS; SOFT TISSUE
Status: DISCONTINUED | OUTPATIENT
Start: 2023-08-08 | End: 2023-08-08

## 2023-08-08 RX ORDER — SODIUM CHLORIDE, SODIUM LACTATE, POTASSIUM CHLORIDE, CALCIUM CHLORIDE 600; 310; 30; 20 MG/100ML; MG/100ML; MG/100ML; MG/100ML
INJECTION, SOLUTION INTRAVENOUS CONTINUOUS
Status: DISCONTINUED | OUTPATIENT
Start: 2023-08-08 | End: 2023-08-08

## 2023-08-08 RX ORDER — METOCLOPRAMIDE HYDROCHLORIDE 5 MG/ML
10 INJECTION INTRAMUSCULAR; INTRAVENOUS ONCE
Status: COMPLETED | OUTPATIENT
Start: 2023-08-08 | End: 2023-08-08

## 2023-08-08 RX ORDER — HYDROMORPHONE HYDROCHLORIDE 2 MG/ML
0.2 INJECTION, SOLUTION INTRAMUSCULAR; INTRAVENOUS; SUBCUTANEOUS EVERY 5 MIN PRN
Status: DISCONTINUED | OUTPATIENT
Start: 2023-08-08 | End: 2023-08-08

## 2023-08-08 RX ORDER — IBUPROFEN 600 MG/1
600 TABLET ORAL EVERY 6 HOURS PRN
Status: DISCONTINUED | OUTPATIENT
Start: 2023-08-08 | End: 2023-08-08 | Stop reason: HOSPADM

## 2023-08-08 RX ORDER — PROPOFOL 10 MG/ML
VIAL (ML) INTRAVENOUS
Status: DISCONTINUED | OUTPATIENT
Start: 2023-08-08 | End: 2023-08-08

## 2023-08-08 RX ORDER — PROCHLORPERAZINE EDISYLATE 5 MG/ML
5 INJECTION INTRAMUSCULAR; INTRAVENOUS EVERY 6 HOURS PRN
Status: DISCONTINUED | OUTPATIENT
Start: 2023-08-08 | End: 2023-08-08 | Stop reason: HOSPADM

## 2023-08-08 RX ORDER — MIDAZOLAM HYDROCHLORIDE 1 MG/ML
2 INJECTION INTRAMUSCULAR; INTRAVENOUS ONCE AS NEEDED
Status: COMPLETED | OUTPATIENT
Start: 2023-08-08 | End: 2023-08-08

## 2023-08-08 RX ORDER — ONDANSETRON 4 MG/1
8 TABLET, ORALLY DISINTEGRATING ORAL EVERY 8 HOURS PRN
Status: DISCONTINUED | OUTPATIENT
Start: 2023-08-08 | End: 2023-08-08 | Stop reason: HOSPADM

## 2023-08-08 RX ORDER — FAMOTIDINE 10 MG/ML
20 INJECTION INTRAVENOUS ONCE
Status: COMPLETED | OUTPATIENT
Start: 2023-08-08 | End: 2023-08-08

## 2023-08-08 RX ORDER — LIDOCAINE HYDROCHLORIDE 10 MG/ML
1 INJECTION, SOLUTION EPIDURAL; INFILTRATION; INTRACAUDAL; PERINEURAL ONCE
Status: CANCELLED | OUTPATIENT
Start: 2023-08-08 | End: 2023-08-08

## 2023-08-08 RX ORDER — DIPHENHYDRAMINE HCL 25 MG
25 CAPSULE ORAL EVERY 4 HOURS PRN
Status: DISCONTINUED | OUTPATIENT
Start: 2023-08-08 | End: 2023-08-08 | Stop reason: HOSPADM

## 2023-08-08 RX ORDER — MEPERIDINE HYDROCHLORIDE 25 MG/ML
12.5 INJECTION INTRAMUSCULAR; INTRAVENOUS; SUBCUTANEOUS ONCE
Status: DISCONTINUED | OUTPATIENT
Start: 2023-08-08 | End: 2023-08-08

## 2023-08-08 RX ORDER — HYDROCODONE BITARTRATE AND ACETAMINOPHEN 5; 325 MG/1; MG/1
1 TABLET ORAL EVERY 4 HOURS PRN
Status: DISCONTINUED | OUTPATIENT
Start: 2023-08-08 | End: 2023-08-08 | Stop reason: HOSPADM

## 2023-08-08 RX ADMIN — ONDANSETRON 4 MG: 2 INJECTION INTRAMUSCULAR; INTRAVENOUS at 09:08

## 2023-08-08 RX ADMIN — MIDAZOLAM HYDROCHLORIDE 2 MG: 1 INJECTION INTRAMUSCULAR; INTRAVENOUS at 09:08

## 2023-08-08 RX ADMIN — METOCLOPRAMIDE 10 MG: 5 INJECTION, SOLUTION INTRAMUSCULAR; INTRAVENOUS at 07:08

## 2023-08-08 RX ADMIN — METHOCARBAMOL 1000 MG: 100 INJECTION INTRAMUSCULAR; INTRAVENOUS at 10:08

## 2023-08-08 RX ADMIN — DEXAMETHASONE SODIUM PHOSPHATE 4 MG: 4 INJECTION, SOLUTION INTRA-ARTICULAR; INTRALESIONAL; INTRAMUSCULAR; INTRAVENOUS; SOFT TISSUE at 09:08

## 2023-08-08 RX ADMIN — MIDAZOLAM HYDROCHLORIDE 2 MG: 1 INJECTION, SOLUTION INTRAMUSCULAR; INTRAVENOUS at 09:08

## 2023-08-08 RX ADMIN — FAMOTIDINE 20 MG: 10 INJECTION INTRAVENOUS at 07:08

## 2023-08-08 RX ADMIN — ACETAMINOPHEN 1000 MG: 500 TABLET, FILM COATED ORAL at 07:08

## 2023-08-08 RX ADMIN — PROPOFOL 130 MG: 10 INJECTION, EMULSION INTRAVENOUS at 09:08

## 2023-08-08 RX ADMIN — Medication 1000 MG: at 07:08

## 2023-08-08 RX ADMIN — SODIUM CHLORIDE, POTASSIUM CHLORIDE, SODIUM LACTATE AND CALCIUM CHLORIDE: 600; 310; 30; 20 INJECTION, SOLUTION INTRAVENOUS at 07:08

## 2023-08-08 RX ADMIN — LIDOCAINE HYDROCHLORIDE 50 MG: 10 INJECTION, SOLUTION EPIDURAL; INFILTRATION; INTRACAUDAL; PERINEURAL at 09:08

## 2023-08-08 RX ADMIN — METOCLOPRAMIDE HYDROCHLORIDE 10 MG: 5 INJECTION INTRAMUSCULAR; INTRAVENOUS at 07:08

## 2023-08-08 RX ADMIN — FENTANYL CITRATE 50 MCG: 50 INJECTION, SOLUTION INTRAMUSCULAR; INTRAVENOUS at 09:08

## 2023-08-08 RX ADMIN — FAMOTIDINE 20 MG: 10 INJECTION, SOLUTION INTRAVENOUS at 07:08

## 2023-08-08 NOTE — ANESTHESIA POSTPROCEDURE EVALUATION
Anesthesia Post Evaluation    Patient: Carina Tamayo    Procedure(s) Performed: Procedure(s) (LRB):  HYSTEROSCOPY, WITH DILATION AND CURETTAGE OF UTERUS (N/A)    Final Anesthesia Type: general      Patient location during evaluation: PACU  Patient participation: Yes- Able to Participate  Level of consciousness: awake and alert  Post-procedure vital signs: reviewed and stable  Pain management: adequate  Airway patency: patent  EROS mitigation strategies: Multimodal analgesia  PONV status at discharge: No PONV  Anesthetic complications: no      Cardiovascular status: hemodynamically stable  Respiratory status: unassisted  Hydration status: euvolemic  Follow-up not needed.          Vitals Value Taken Time   /77 08/08/23 1030   Temp 36.3 °C (97.4 °F) 08/08/23 1030   Pulse 58 08/08/23 1030   Resp 18 08/08/23 1030   SpO2 100 % 08/08/23 1030         Event Time   Out of Recovery 10:25:00         Pain/Rachele Score: Pain Rating Prior to Med Admin: 0 (8/8/2023  7:55 AM)  Rachele Score: 10 (8/8/2023 10:20 AM)

## 2023-08-08 NOTE — ANESTHESIA PROCEDURE NOTES
Intubation    Date/Time: 8/8/2023 9:25 AM    Performed by: Rebekah Burr CRNA  Authorized by: Hong Velarde DO    Intubation:     Induction:  Intravenous    Intubated:  Postinduction    Mask Ventilation:  Easy mask    Attempts:  1    Attempted By:  CRNA    Difficult Airway Encountered?: No      Complications:  None    Airway Device:  Supraglottic airway/LMA    Airway Device Size:  3.0    Secured at:  The lips    Placement Verified By:  Capnometry    Complicating Factors:  None    Findings Post-Intubation:  BS equal bilateral

## 2023-08-08 NOTE — ANESTHESIA PREPROCEDURE EVALUATION
2023  Carina Tamayo is a 28 y.o., female presents  For hysteroscopy, D & C.    Other Medical History   Disorder of thyroid, unspecified Missed      Surgical History    THYROIDECTOMY TUMOR EXCISION   DILATION AND CURETTAGE OF UTERUS USING SUCTION        Pre-op Assessment    I have reviewed the Patient Summary Reports.     I have reviewed the Nursing Notes. I have reviewed the NPO Status.   I have reviewed the Medications.     Review of Systems  Anesthesia Hx:  No problems with previous Anesthesia    Social:  Non-Smoker        Physical Exam  General: Well nourished, Cooperative, Alert and Oriented    Airway:  Mallampati: I   Mouth Opening: Normal  TM Distance: Normal  Tongue: Normal  Neck ROM: Normal ROM    Dental:  Intact    Chest/Lungs:  Clear to auscultation, Normal Respiratory Rate    Heart:  Rate: Normal  Rhythm: Regular Rhythm  Sounds: Normal    Abdomen:  Normal, Soft, Nontender        Anesthesia Plan  Type of Anesthesia, risks & benefits discussed:    Anesthesia Type: Gen Supraglottic Airway  Intra-op Monitoring Plan: Standard ASA Monitors  Post Op Pain Control Plan: multimodal analgesia  Induction:  IV  Airway Plan: Direct  Informed Consent: Informed consent signed with the Patient and all parties understand the risks and agree with anesthesia plan.  All questions answered.   ASA Score: 1  Day of Surgery Review of History & Physical: H&P Update referred to the surgeon/provider.    Ready For Surgery From Anesthesia Perspective.     .

## 2023-08-08 NOTE — OP NOTE
GYN Operative Report  Date: 8/8/2023    Preop Diagnosis: Recurrent pregnancy loss    Postop Diagnosis: Same    Procedure: Diagnostic Hysteroscopy with endometrial biopsy    Surgeon: Rebekah العراقي M.D.    Assistant: None    Anesthesia: General     IVF: 600 mL    Distention media Deficit: 100 mL    EBL:10 mL    Urine output: 50 mL    Specimen: Endometrial biopsy    Complications: None    Findings: Normal appearing cervical canal and intrauterine cavity.  Bilateral fallopian tube ostia were visualized and appear nonobstructed.    Procedure: The patient was taken to the OR, general anesthesia was obtained without difficulty.  She was placed in the dorsal lithotomy position with Otoniel stirrups.  Exam under anesthesia revealed the above mentioned findings.  She was then prepped and draped in the normal sterile fashion.  A speculum was placed in the vagina.  A single tooth tenaculum was used to grasp the anterior lip of the cervix.  The uterus sounded to 8 cm.  The cervical os was sequentially dilated to accommodate an 8mm hysteroscope using Hegar dilators.  A hysteroscope was introduced under direct visualization and the uterus was distended with normal saline. The above mentioned findings were noted.  A gentle endometrial curettage was performed. The specimen was sent to pathology.  The tenaculum was removed from the cervix and the puncture site was noted to be hemostatic after one silver nitrate stick was used. The patient tolerated the procedure well.  All instrument and sponge counts were correct times two.  The patient was awakened from general anesthesia and taken to the recovery room in stable condition.

## 2023-08-08 NOTE — DISCHARGE SUMMARY
[facility] Surgical - Periop Services  Discharge Summary  OBGYN    Admission date: 2023  Discharge date: 2023    Admit Dx:      Patient Active Problem List   Diagnosis    Missed     Recurrent pregnancy loss     Discharge Dx:    Patient Active Problem List   Diagnosis    Missed     Recurrent pregnancy loss       Attending Physician: Rebekah العراقي   Discharge Provider: Rebekah العراقي    Procedures Performed: Procedure(s) (LRB):  HYSTEROSCOPY, WITH DILATION AND CURETTAGE OF UTERUS (N/A)    Hospital Course: Patient was admitted on 2023 to undergo hysteroscopy, D&C secondary to  unexplained, recurrent pregnancy loss .  Procedure was uncomplicated, for full details see operative report. Barring any unforseen incidents, patient will be discharged home when she is able to ambulate, void, and tolerate PO intake.    Consults: none    Significant Diagnostic Studies:   Lab Results   Component Value Date    WBC 5.22 2023    HGB 12.8 2023    HCT 39.8 2023    MCV 90.5 2023     2023     No image results found.      Discharged Condition: stable    Disposition: Home    Follow Up:    Follow-up Information       Rebekah العراقي MD Follow up on 2023.    Specialty: Obstetrics and Gynecology  Why: your follow up appointment is for  @ 9:50 am  Contact information:  [address] [place] [id number] [phone]                             Medications:  Discharge Medication List as of 2023 10:44 AM        CONTINUE these medications which have NOT CHANGED    Details   levothyroxine (SYNTHROID) 100 MCG tablet Take 150 mcg by mouth once daily., Historical Med             No discharge procedures on file.

## 2023-08-08 NOTE — TRANSFER OF CARE
"Anesthesia Transfer of Care Note    Patient: Carina Tamayo    Procedure(s) Performed: Procedure(s) (LRB):  HYSTEROSCOPY, WITH DILATION AND CURETTAGE OF UTERUS (N/A)    Patient location: PACU    Anesthesia Type: general    Transport from OR: Transported from OR on room air with adequate spontaneous ventilation    Post pain: adequate analgesia    Post assessment: no apparent anesthetic complications    Post vital signs: stable    Level of consciousness: sedated    Nausea/Vomiting: no nausea/vomiting    Complications: none    Transfer of care protocol was followed      Last vitals:   Visit Vitals  /75   Pulse 73   Temp 36.7 °C (98 °F) (Oral)   Resp 16   Ht 5' 3" (1.6 m)   Wt 61.2 kg (135 lb)   LMP 08/02/2023 (Approximate)   SpO2 100%   Breastfeeding No   BMI 23.91 kg/m²     "

## 2023-08-08 NOTE — PLAN OF CARE
Pt is [id number] pain-radha score 10-she is ready for phase2 care and meets criteria per dr liang-to rm 9 via bed with lorelei

## 2023-08-08 NOTE — H&P
Pre-Operative History and Physical   Obstetrics and Gynecology    Carina Tamayo is a 28 y.o. female  for preop examination.  She is scheduled for a diagnostic hysteroscopy on 23 for recurrent pregnancy loss.  SAB at 8 weeks x 2 with normal RPL labs.     ROS:  GENERAL: Denies weight gain or weight loss. Feeling well overall.   SKIN: Denies rash or lesions.   HEAD: Denies head injury or headache.   NODES: Denies enlarged lymph nodes.   CHEST: Denies chest pain or shortness of breath.   CARDIOVASCULAR: Denies palpitations or left sided chest pain.   ABDOMEN: No abdominal pain, constipation, diarrhea, nausea, vomiting or rectal bleeding.   URINARY: No frequency, dysuria, hematuria, or burning on urination.  REPRODUCTIVE: See HPI.   BREASTS: denies pain, lumps, or nipple discharge.   HEMATOLOGIC: No easy bruisability or excessive bleeding with the exception of menstrual cycles.  MUSCULOSKELETAL: Denies joint pain or swelling.   NEUROLOGIC: Denies syncope or weakness.   PSYCHIATRIC: Denies depression, anxiety or mood swings.    Past Medical History:   Diagnosis Date    Disorder of thyroid, unspecified     Missed       Past Surgical History:   Procedure Laterality Date    DILATION AND CURETTAGE OF UTERUS USING SUCTION N/A 3/28/2023    Procedure: DILATION AND CURETTAGE, UTERUS, USING SUCTION;  Surgeon: Rebekah العراقي MD;  Location: [facility];  Service: OB/GYN;  Laterality: N/A;    THYROIDECTOMY      TUMOR EXCISION      removed from thyroid     Family History   Problem Relation Age of Onset    No Known Problems Mother     No Known Problems Father      Review of patient's allergies indicates:   Allergen Reactions    Doxycycline Rash       Current Facility-Administered Medications:     lactated ringers infusion, , Intravenous, Continuous, Rebekah العراقي MD, Last Rate: 100 mL/hr at 23 0753, New Bag at 23 0753    silver nitrate applicators 75-25 % applicator, , , ,   Outpatient Medications  Marked as Taking for the 8/8/23 encounter (Hospital Encounter)   Medication Sig Dispense Refill    levothyroxine (SYNTHROID) 100 MCG tablet Take 150 mcg by mouth once daily.       Social History     Tobacco Use    Smoking status: Never    Smokeless tobacco: Never   Substance Use Topics    Alcohol use: Not Currently    Drug use: Never         Vitals:    08/08/23 0748   BP:    Pulse:    Resp: 16   Temp:      General Appearance: no acute distress, alert and oriented   Cardiovascular Exam: regular rate and rhythm  Gastrointestinal Exam: clear to auscultation bilaterally      Assessment: RPL, normal labs    Plan:   To OR for hysteroscopy D&C.  Path to examine for chronic endometritis.  I have discussed the risks, benefits, indications, and alternatives of the procedure in detail.  The patient verbalizes her understanding.  All questions answered.  Consents signed.  The patient agrees to proceed to proceed as planned.

## 2023-08-09 VITALS
TEMPERATURE: 97 F | HEART RATE: 53 BPM | BODY MASS INDEX: 23.92 KG/M2 | WEIGHT: 135 LBS | HEIGHT: 63 IN | RESPIRATION RATE: 18 BRPM | DIASTOLIC BLOOD PRESSURE: 53 MMHG | SYSTOLIC BLOOD PRESSURE: 105 MMHG | OXYGEN SATURATION: 99 %

## 2023-08-14 LAB — PSYCHE PATHOLOGY RESULT: NORMAL

## 2024-04-25 ENCOUNTER — LAB VISIT (OUTPATIENT)
Dept: LAB | Facility: HOSPITAL | Age: 29
End: 2024-04-25
Attending: OBSTETRICS & GYNECOLOGY
Payer: COMMERCIAL

## 2024-04-25 DIAGNOSIS — R94.6 NONSPECIFIC ABNORMAL RESULTS OF THYROID FUNCTION STUDY: Primary | ICD-10-CM

## 2024-04-25 LAB
T3FREE SERPL-MCNC: 2.44 PG/ML (ref 1.58–3.91)
T4 FREE SERPL-MCNC: 1.08 NG/DL (ref 0.7–1.48)
TSH SERPL-ACNC: 14.32 UIU/ML (ref 0.35–4.94)

## 2024-04-25 PROCEDURE — 84443 ASSAY THYROID STIM HORMONE: CPT

## 2024-04-25 PROCEDURE — 84439 ASSAY OF FREE THYROXINE: CPT

## 2024-04-25 PROCEDURE — 36415 COLL VENOUS BLD VENIPUNCTURE: CPT

## 2024-04-25 PROCEDURE — 84481 FREE ASSAY (FT-3): CPT

## 2024-05-02 DIAGNOSIS — Z85.850 HISTORY OF THYROID CANCER: Primary | ICD-10-CM

## 2024-05-13 ENCOUNTER — OFFICE VISIT (OUTPATIENT)
Dept: ENDOCRINOLOGY | Facility: CLINIC | Age: 29
End: 2024-05-13
Payer: COMMERCIAL

## 2024-05-13 ENCOUNTER — LAB VISIT (OUTPATIENT)
Dept: LAB | Facility: HOSPITAL | Age: 29
End: 2024-05-13
Attending: INTERNAL MEDICINE
Payer: COMMERCIAL

## 2024-05-13 VITALS
HEIGHT: 63 IN | OXYGEN SATURATION: 97 % | HEART RATE: 67 BPM | SYSTOLIC BLOOD PRESSURE: 104 MMHG | WEIGHT: 143.88 LBS | BODY MASS INDEX: 25.49 KG/M2 | DIASTOLIC BLOOD PRESSURE: 64 MMHG

## 2024-05-13 DIAGNOSIS — E89.0 POSTSURGICAL HYPOTHYROIDISM: ICD-10-CM

## 2024-05-13 DIAGNOSIS — Z34.91 FIRST TRIMESTER PREGNANCY: ICD-10-CM

## 2024-05-13 DIAGNOSIS — E89.0 POSTSURGICAL HYPOTHYROIDISM: Primary | ICD-10-CM

## 2024-05-13 LAB
T4 FREE SERPL-MCNC: 1.46 NG/DL (ref 0.71–1.51)
TSH SERPL DL<=0.005 MIU/L-ACNC: 0.81 UIU/ML (ref 0.4–4)

## 2024-05-13 PROCEDURE — 99204 OFFICE O/P NEW MOD 45 MIN: CPT | Mod: S$GLB,,, | Performed by: INTERNAL MEDICINE

## 2024-05-13 PROCEDURE — 36415 COLL VENOUS BLD VENIPUNCTURE: CPT | Performed by: INTERNAL MEDICINE

## 2024-05-13 PROCEDURE — 1159F MED LIST DOCD IN RCRD: CPT | Mod: CPTII,S$GLB,, | Performed by: INTERNAL MEDICINE

## 2024-05-13 PROCEDURE — 3008F BODY MASS INDEX DOCD: CPT | Mod: CPTII,S$GLB,, | Performed by: INTERNAL MEDICINE

## 2024-05-13 PROCEDURE — 84443 ASSAY THYROID STIM HORMONE: CPT | Performed by: INTERNAL MEDICINE

## 2024-05-13 PROCEDURE — 3074F SYST BP LT 130 MM HG: CPT | Mod: CPTII,S$GLB,, | Performed by: INTERNAL MEDICINE

## 2024-05-13 PROCEDURE — 99999 PR PBB SHADOW E&M-EST. PATIENT-LVL III: CPT | Mod: PBBFAC,,, | Performed by: INTERNAL MEDICINE

## 2024-05-13 PROCEDURE — 3078F DIAST BP <80 MM HG: CPT | Mod: CPTII,S$GLB,, | Performed by: INTERNAL MEDICINE

## 2024-05-13 PROCEDURE — 84439 ASSAY OF FREE THYROXINE: CPT | Performed by: INTERNAL MEDICINE

## 2024-05-13 RX ORDER — PROGESTERONE 100 MG/1
CAPSULE ORAL
COMMUNITY

## 2024-05-13 RX ORDER — LEVOTHYROXINE SODIUM 175 UG/1
175 TABLET ORAL
COMMUNITY
Start: 2024-04-28 | End: 2024-05-30 | Stop reason: SDUPTHER

## 2024-05-13 NOTE — ASSESSMENT & PLAN NOTE
Appears clinically euthyroid   Denies any symptoms at this time, besides weight gain   Previous dose was 125 mcg   Current dose 175 mcg started two weeks ago     Reviewed pregnancy adjustment to thyroid hormone in a patient who is athyrotic.     PLAN:  Check levels today   prenatal vitamin containing iodine 150 mcg     ADDENDUM  Results for orders placed or performed in visit on 04/25/24   TSH   Result Value Ref Range    TSH 14.324 (H) 0.350 - 4.940 uIU/mL   T4, Free   Result Value Ref Range    Thyroxine Free 1.08 0.70 - 1.48 ng/dL   T3, Free (OLG)   Result Value Ref Range    T3 Free 2.44 1.58 - 3.91 pg/mL     Continue current dose, plan to repeat labs in two weeks to avoid biochemical thyrotoxicosis

## 2024-05-13 NOTE — PATIENT INSTRUCTIONS
Thyroid hormone requirements increase during pregnancy particularly in the early weeks of pregnancy. We will monitor the changes in dosing with regular labs  and adjust your thyroid hormone dose as needed.  Typically for women without a thyroid (surgery) we increase the dose by increasing the number of pills from 7 pills per week to 10pills per week.       I recommend a prenatal vitamin containing iodine 150 mcg

## 2024-05-13 NOTE — PROGRESS NOTES
"Subjective:      Patient ID: Carina Tamayo is a 29 y.o. female.    Chief Complaint:  Consult      History of Present Illness  Currently at 11 weeks gestation.   Hypothyroidism diagnosed at age 12. Diagnosed with PTC at age 16 and underwent a complete thyroidectomy in 2011.     Currently feeling well.   Levothyroxine was increased 2 weeks ago.   Previous dose was 125 mcg   Current dose was 175 mcg     Denies any symptoms of over-treatment including, palpitations, difficulty getting to sleep, restlessness, anxiety or irritability. Wakes up at night occasionally. Feels pretty tired.     Denies amiodarone, lithium use or contrast exposure in the past six weeks.     Current medication is levothyroxine 175 mcg daily   Takes it properly without food first thing in the morning with water. Waits 30 - 45 minutes before breakfast or other pills.    3/2023 --> miscarried at age 7 weeks   6/2023 --> blood clot d/c at 8 weeks   7/2023 --> had endometritis treated with abx  Currently 11 weeks gestation     Review of Systems  Denies recent levels   Objective:   Physical Exam  Vitals:    05/13/24 1036   BP: 104/64   BP Location: Right arm   Patient Position: Sitting   BP Method: Medium (Manual)   Pulse: 67   SpO2: 97%   Weight: 65.2 kg (143 lb 13.6 oz)   Height: 5' 3" (1.6 m)       BP Readings from Last 3 Encounters:   05/13/24 104/64   08/08/23 (!) 105/53   03/28/23 103/71     Wt Readings from Last 1 Encounters:   05/13/24 1036 65.2 kg (143 lb 13.6 oz)         Body mass index is 25.48 kg/m².    Lab Review:   No results found for: "HGBA1C"  No results found for: "CHOL", "HDL", "LDLCALC", "TRIG", "CHOLHDL"  Lab Results   Component Value Date    TSH 0.806 05/13/2024         Assessment and Plan     Postsurgical hypothyroidism  Appears clinically euthyroid   Denies any symptoms at this time, besides weight gain   Previous dose was 125 mcg   Current dose 175 mcg started two weeks ago     Reviewed pregnancy adjustment to thyroid hormone " Thank you for allowing us to participate in your care today.  Please find below your visit diagnosis and the plan going forward.    1. Cervical radiculopathy    2. Herniated cervical intervertebral disc    3. Facet arthropathy, cervical (H)    4. 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 Call direct clinic number [[phone]] at any time with questions or concerns.    Mykel De La Rosa DO CAQSM  [facility]  Website: [email or web address]  Twitter: [email or web address]     in a patient who is athyrotic.     PLAN:  Check levels today   prenatal vitamin containing iodine 150 mcg     ADDENDUM  Results for orders placed or performed in visit on 04/25/24   TSH   Result Value Ref Range    TSH 14.324 (H) 0.350 - 4.940 uIU/mL   T4, Free   Result Value Ref Range    Thyroxine Free 1.08 0.70 - 1.48 ng/dL   T3, Free (OLG)   Result Value Ref Range    T3 Free 2.44 1.58 - 3.91 pg/mL     Continue current dose, plan to repeat labs in two weeks to avoid biochemical thyrotoxicosis    First trimester pregnancy  TSH at goal

## 2024-05-14 ENCOUNTER — PATIENT MESSAGE (OUTPATIENT)
Dept: ENDOCRINOLOGY | Facility: CLINIC | Age: 29
End: 2024-05-14
Payer: COMMERCIAL

## 2024-05-14 ENCOUNTER — OFFICE VISIT (OUTPATIENT)
Dept: MATERNAL FETAL MEDICINE | Facility: CLINIC | Age: 29
End: 2024-05-14
Payer: COMMERCIAL

## 2024-05-14 VITALS
SYSTOLIC BLOOD PRESSURE: 106 MMHG | DIASTOLIC BLOOD PRESSURE: 74 MMHG | BODY MASS INDEX: 25.58 KG/M2 | HEART RATE: 92 BPM | WEIGHT: 144.38 LBS | HEIGHT: 63 IN

## 2024-05-14 DIAGNOSIS — E89.0 POSTSURGICAL HYPOTHYROIDISM: Primary | ICD-10-CM

## 2024-05-14 DIAGNOSIS — O99.281 HYPOTHYROIDISM AFFECTING PREGNANCY IN FIRST TRIMESTER: Primary | ICD-10-CM

## 2024-05-14 DIAGNOSIS — E03.9 HYPOTHYROIDISM AFFECTING PREGNANCY IN FIRST TRIMESTER: Primary | ICD-10-CM

## 2024-05-14 PROBLEM — Z34.91 FIRST TRIMESTER PREGNANCY: Status: ACTIVE | Noted: 2024-05-14

## 2024-05-14 PROCEDURE — 99204 OFFICE O/P NEW MOD 45 MIN: CPT | Mod: S$GLB,,, | Performed by: OBSTETRICS & GYNECOLOGY

## 2024-05-14 PROCEDURE — 1160F RVW MEDS BY RX/DR IN RCRD: CPT | Mod: CPTII,S$GLB,, | Performed by: OBSTETRICS & GYNECOLOGY

## 2024-05-14 PROCEDURE — 3078F DIAST BP <80 MM HG: CPT | Mod: CPTII,S$GLB,, | Performed by: OBSTETRICS & GYNECOLOGY

## 2024-05-14 PROCEDURE — 1159F MED LIST DOCD IN RCRD: CPT | Mod: CPTII,S$GLB,, | Performed by: OBSTETRICS & GYNECOLOGY

## 2024-05-14 PROCEDURE — 76801 OB US < 14 WKS SINGLE FETUS: CPT | Mod: S$GLB,,, | Performed by: OBSTETRICS & GYNECOLOGY

## 2024-05-14 PROCEDURE — 3074F SYST BP LT 130 MM HG: CPT | Mod: CPTII,S$GLB,, | Performed by: OBSTETRICS & GYNECOLOGY

## 2024-05-14 PROCEDURE — 3008F BODY MASS INDEX DOCD: CPT | Mod: CPTII,S$GLB,, | Performed by: OBSTETRICS & GYNECOLOGY

## 2024-05-14 NOTE — ASSESSMENT & PLAN NOTE
She has a history of thyroid cancer with subsequent thyroidectomy. She has been having labile TSH values for several weeks. She had a consult with endocrinologist, Dr Madrigal.   Thyroid requirements increase in pregnancy due to increases in metabolism and thyroid binding globulin. Thyroid function tests should be followed closely to ensure adequate treatment.  Hypothyroidism has been associated with higher pregnancy complication rates including miscarriage, preeclampsia, abruption, low birth weight and stillbirth. Untreated hypothyroidism has also been associated with adverse fetal neurological development, but well treated hypothyroidism (i.e., euthyroid state) carries an excellent prognosis for mother and baby.      Most recent TFTs (5/13/24) normal: TSH 0.806, FT4 1.46  Plan to repeat TFTs last week of May with Dr Madrigal.    Recommendations:  Follow fetal growth in the third trimester  Medication management:  Continue Levothyroxine at the current dose of 175 micrograms/day   Up to 1/3 of women may require increased hormone replacement in the first trimester, thus consider an empiric 25% increase in medication dose at pregnancy confirmation, while discharging postpartum patients on their pre-pregnancy dose.    Laboratory evaluation:   TSH should be monitored at least every trimester, or every 4 weeks after any medication adjustment. Adjust treatment as necessary to maintain TSH goal < 2.5 mIU/L.

## 2024-05-14 NOTE — PROGRESS NOTES
MATERNAL-FETAL MEDICINE   CONSULT NOTE    Provider requesting consultation: Dr العراقي    SUBJECTIVE:     Ms. Carina Tamayo is a 29 y.o.  female with IUP at 10w5d who is seen in consultation by MFM for evaluation and management of:  Problem   - Hypothyroidism affecting pregnancy in first trimester     Carina presents for consultation due to post-procedural hypothyroidism. She has a history of thyroid cancer with subsequent thyroidectomy. TSH evaluations have been labile for the past several weeks. Levothyroxine was increased from 125mcg to 175 mcg approx two weeks ago for TSH 14 despite strict compliance with medication regimen. She completed an endocrinology consult with Dr Madrigal yesterday. TFTs were normal. Plan to repeat TFTs in two weeks.        Medication List with Changes/Refills   Current Medications    LEVOTHYROXINE (SYNTHROID, LEVOTHROID) 175 MCG TABLET    Take 175 mcg by mouth.    PROGESTERONE (PROMETRIUM) 100 MG CAPSULE    INSERT 2 CAPSULES VAGINALLY AT BEDTIME STARTING 3 DAYS AFTER OVULATION . CONTINUE FOR 14 DAYS       Review of patient's allergies indicates:  No Known Allergies    PMH:  Past Medical History:   Diagnosis Date    Disorder of thyroid, unspecified     Missed         PObHx:  OB History    Para Term  AB Living   3       2     SAB IAB Ectopic Multiple Live Births   2              # Outcome Date GA Lbr Jayy/2nd Weight Sex Type Anes PTL Lv   3 Current            2 SAB 2023 7w0d    SAB         Birth Comments: D&C pt reports blood clot on the uterus   1 SAB 2023 7w0d    SAB         Birth Comments: D&C       PSH:  Past Surgical History:   Procedure Laterality Date    DILATION AND CURETTAGE OF UTERUS USING SUCTION N/A 3/28/2023    Procedure: DILATION AND CURETTAGE, UTERUS, USING SUCTION;  Surgeon: Rebekah العراقي MD;  Location: [facility];  Service: OB/GYN;  Laterality: N/A;    HYSTEROSCOPY WITH DILATION AND CURETTAGE OF UTERUS N/A 2023    Procedure: HYSTEROSCOPY,  "WITH DILATION AND CURETTAGE OF UTERUS;  Surgeon: Rebekah العراقي MD;  Location: [facility] OR;  Service: OB/GYN;  Laterality: N/A;    THYROIDECTOMY      TUMOR EXCISION      removed from thyroid       Family history:family history includes No Known Problems in her father and mother.    Social history: reports that she has never smoked. She has never used smokeless tobacco. She reports that she does not currently use alcohol. She reports that she does not use drugs.    Genetic history: The patient denies any inherited genetic diseases or birth defects in herself or her partner's personal history or family.    Objective:   /74 (BP Location: Right arm)   Pulse 92   Ht 5' 3" (1.6 m)   Wt 65.5 kg (144 lb 6.4 oz)   LMP 2023 (Approximate) Comment: missed AB  BMI 25.58 kg/m²     Ultrasound performed. See viewpoint for full ultrasound report.    A pride living IUP is identified, and the CRL is appropriate for established gestational age. Normal yolk sac is present. Maternal structures such as uterus, cervix, and ovaries appear normal.    ASSESSMENT/PLAN:     29 y.o.  female with IUP at 10w5d     - Hypothyroidism affecting pregnancy in first trimester  She has a history of thyroid cancer with subsequent thyroidectomy. She has been having labile TSH values for several weeks. She had a consult with endocrinologist, Dr Madrigal.   Thyroid requirements increase in pregnancy due to increases in metabolism and thyroid binding globulin. Thyroid function tests should be followed closely to ensure adequate treatment.  Hypothyroidism has been associated with higher pregnancy complication rates including miscarriage, preeclampsia, abruption, low birth weight and stillbirth. Untreated hypothyroidism has also been associated with adverse fetal neurological development, but well treated hypothyroidism (i.e., euthyroid state) carries an excellent prognosis for mother and baby.      Most recent TFTs (24) normal: TSH " 0.806, FT4 1.46  Plan to repeat TFTs last week of May with Dr Madrigal.    Recommendations:  Follow fetal growth in the third trimester  Medication management:  Continue Levothyroxine at the current dose of 175 micrograms/day   Up to 1/3 of women may require increased hormone replacement in the first trimester, thus consider an empiric 25% increase in medication dose at pregnancy confirmation, while discharging postpartum patients on their pre-pregnancy dose.    Laboratory evaluation:   TSH should be monitored at least every trimester, or every 4 weeks after any medication adjustment. Adjust treatment as necessary to maintain TSH goal < 2.5 mIU/L.        FOLLOW UP:   F/u in 10 weeks for US/MFM visit    This consultation was completed with the assistance of Elle Zapien NP.      Ashley Michelle MD  Maternal Fetal Medicine

## 2024-05-23 LAB
HBV SURFACE AG SERPL QL IA: NEGATIVE
HCV AB SERPL QL IA: NEGATIVE
HIV 1+2 AB+HIV1 P24 AG SERPL QL IA: NEGATIVE
TREPONEMA PALLIDUM ANTIBODIES (IGG, IGM): NEGATIVE

## 2024-05-28 ENCOUNTER — LAB VISIT (OUTPATIENT)
Dept: LAB | Facility: HOSPITAL | Age: 29
End: 2024-05-28
Attending: INTERNAL MEDICINE
Payer: COMMERCIAL

## 2024-05-28 DIAGNOSIS — E89.0 POSTSURGICAL HYPOTHYROIDISM: ICD-10-CM

## 2024-05-28 LAB
T4 FREE SERPL-MCNC: 1.56 NG/DL (ref 0.7–1.48)
TSH SERPL-ACNC: 0.4 UIU/ML (ref 0.35–4.94)

## 2024-05-28 PROCEDURE — 36415 COLL VENOUS BLD VENIPUNCTURE: CPT

## 2024-05-30 ENCOUNTER — PATIENT MESSAGE (OUTPATIENT)
Dept: ENDOCRINOLOGY | Facility: CLINIC | Age: 29
End: 2024-05-30
Payer: COMMERCIAL

## 2024-05-30 DIAGNOSIS — E89.0 POSTSURGICAL HYPOTHYROIDISM: Primary | ICD-10-CM

## 2024-05-30 RX ORDER — LEVOTHYROXINE SODIUM 175 UG/1
175 TABLET ORAL DAILY
Qty: 30 TABLET | Refills: 6 | Status: SHIPPED | OUTPATIENT
Start: 2024-05-30

## 2024-05-30 NOTE — TELEPHONE ENCOUNTER
13 weeks gestation   Results for orders placed or performed in visit on 05/28/24   TSH   Result Value Ref Range    TSH 0.404 0.350 - 4.940 uIU/mL   T4, Free   Result Value Ref Range    Thyroxine Free 1.56 (H) 0.70 - 1.48 ng/dL     Previous TSH 0.8    Current LT4 dose: 175 mcg daily

## 2024-06-14 ENCOUNTER — LAB VISIT (OUTPATIENT)
Dept: LAB | Facility: HOSPITAL | Age: 29
End: 2024-06-14
Attending: INTERNAL MEDICINE
Payer: COMMERCIAL

## 2024-06-14 DIAGNOSIS — E89.0 POSTSURGICAL HYPOTHYROIDISM: ICD-10-CM

## 2024-06-14 LAB
T4 FREE SERPL-MCNC: 1.38 NG/DL (ref 0.7–1.48)
TSH SERPL-ACNC: 0.17 UIU/ML (ref 0.35–4.94)

## 2024-06-14 PROCEDURE — 36415 COLL VENOUS BLD VENIPUNCTURE: CPT

## 2024-06-17 ENCOUNTER — PATIENT MESSAGE (OUTPATIENT)
Dept: ENDOCRINOLOGY | Facility: CLINIC | Age: 29
End: 2024-06-17
Payer: COMMERCIAL

## 2024-06-17 DIAGNOSIS — Z34.91 FIRST TRIMESTER PREGNANCY: ICD-10-CM

## 2024-06-17 DIAGNOSIS — E89.0 POSTSURGICAL HYPOTHYROIDISM: Primary | ICD-10-CM

## 2024-06-17 NOTE — TELEPHONE ENCOUNTER
Results for orders placed or performed in visit on 06/14/24   TSH   Result Value Ref Range    TSH 0.172 (L) 0.350 - 4.940 uIU/mL   T4, Free   Result Value Ref Range    Thyroxine Free 1.38 0.70 - 1.48 ng/dL

## 2024-07-17 DIAGNOSIS — Z85.850 HISTORY OF THYROID CANCER: Primary | ICD-10-CM

## 2024-07-18 ENCOUNTER — PROCEDURE VISIT (OUTPATIENT)
Dept: MATERNAL FETAL MEDICINE | Facility: CLINIC | Age: 29
End: 2024-07-18
Payer: COMMERCIAL

## 2024-07-18 ENCOUNTER — OFFICE VISIT (OUTPATIENT)
Dept: MATERNAL FETAL MEDICINE | Facility: CLINIC | Age: 29
End: 2024-07-18
Payer: COMMERCIAL

## 2024-07-18 VITALS
BODY MASS INDEX: 25.62 KG/M2 | HEART RATE: 72 BPM | HEIGHT: 63 IN | WEIGHT: 144.63 LBS | SYSTOLIC BLOOD PRESSURE: 120 MMHG | DIASTOLIC BLOOD PRESSURE: 82 MMHG

## 2024-07-18 DIAGNOSIS — E03.9 HYPOTHYROIDISM AFFECTING PREGNANCY IN FIRST TRIMESTER: Primary | ICD-10-CM

## 2024-07-18 DIAGNOSIS — O99.281 HYPOTHYROIDISM AFFECTING PREGNANCY IN FIRST TRIMESTER: Primary | ICD-10-CM

## 2024-07-18 DIAGNOSIS — Z85.850 HISTORY OF THYROID CANCER: ICD-10-CM

## 2024-07-18 PROCEDURE — 76817 TRANSVAGINAL US OBSTETRIC: CPT | Mod: S$GLB,,, | Performed by: OBSTETRICS & GYNECOLOGY

## 2024-07-18 PROCEDURE — 3008F BODY MASS INDEX DOCD: CPT | Mod: CPTII,S$GLB,, | Performed by: OBSTETRICS & GYNECOLOGY

## 2024-07-18 PROCEDURE — 1159F MED LIST DOCD IN RCRD: CPT | Mod: CPTII,S$GLB,, | Performed by: OBSTETRICS & GYNECOLOGY

## 2024-07-18 PROCEDURE — 99213 OFFICE O/P EST LOW 20 MIN: CPT | Mod: S$GLB,,, | Performed by: OBSTETRICS & GYNECOLOGY

## 2024-07-18 PROCEDURE — 3079F DIAST BP 80-89 MM HG: CPT | Mod: CPTII,S$GLB,, | Performed by: OBSTETRICS & GYNECOLOGY

## 2024-07-18 PROCEDURE — 3074F SYST BP LT 130 MM HG: CPT | Mod: CPTII,S$GLB,, | Performed by: OBSTETRICS & GYNECOLOGY

## 2024-07-18 PROCEDURE — 76805 OB US >/= 14 WKS SNGL FETUS: CPT | Mod: S$GLB,,, | Performed by: OBSTETRICS & GYNECOLOGY

## 2024-07-18 PROCEDURE — 1160F RVW MEDS BY RX/DR IN RCRD: CPT | Mod: CPTII,S$GLB,, | Performed by: OBSTETRICS & GYNECOLOGY

## 2024-07-18 NOTE — ASSESSMENT & PLAN NOTE
She has a history of thyroid cancer with subsequent thyroidectomy. She has been having labile TSH values for several weeks. She had a consult with endocrinologist, Dr Madrigal.   Thyroid requirements increase in pregnancy due to increases in metabolism and thyroid binding globulin. Thyroid function tests should be followed closely to ensure adequate treatment.  Hypothyroidism has been associated with higher pregnancy complication rates including miscarriage, preeclampsia, abruption, low birth weight and stillbirth. Untreated hypothyroidism has also been associated with adverse fetal neurological development, but well treated hypothyroidism (i.e., euthyroid state) carries an excellent prognosis for mother and baby.      Most recent TFTs (6/14/24)-   Showed slightly suppressed TSH.   Synthroid was adjusted at that time by endocrinology. Repeat labs due now.    Recommendations:  Follow fetal growth in the third trimester  Medication management:  Continue Levothyroxine at the current dose of 175 micrograms/day   Up to 1/3 of women may require increased hormone replacement in the first trimester, thus consider an empiric 25% increase in medication dose at pregnancy confirmation, while discharging postpartum patients on their pre-pregnancy dose.    Laboratory evaluation:   TSH should be monitored at least every trimester, or every 4 weeks after any medication adjustment. Adjust treatment as necessary to maintain TSH goal < 2.5 mIU/L.

## 2024-07-18 NOTE — PROGRESS NOTES
"MATERNAL-FETAL MEDICINE PROGRESS NOTE      SUBJECTIVE:     Ms. Carina Tamayo is a 29 y.o.  female with IUP at 20w0d who is seen in consultation by MFLANDEN for evaluation and management of:  Problem   - Hypothyroidism affecting pregnancy in first trimester         She states that she is doing well and she has no current complaints.  She denies any leaking fluid, vaginal bleeding, or discharge.  She has been adjusted on her Synthroid by Dr. Madrigal  and is currently taking 175 mcg every day except  where she takes half a tablet.  She states that she is due for labs right now and she is planning to go soon.    She does not have a consultation set up to see Dr. Madrigal again,  they plan to just communicate through EMR.       Medication List with Changes/Refills   Current Medications    LEVOTHYROXINE (SYNTHROID, LEVOTHROID) 175 MCG TABLET    Take 1 tablet (175 mcg total) by mouth once daily. Except on Sundays take 1/2 pill   Discontinued Medications    PROGESTERONE (PROMETRIUM) 100 MG CAPSULE    INSERT 2 CAPSULES VAGINALLY AT BEDTIME STARTING 3 DAYS AFTER OVULATION . CONTINUE FOR 14 DAYS     Objective:   /82 (BP Location: Right arm)   Pulse 72   Ht 5' 3" (1.6 m)   Wt 65.6 kg (144 lb 10 oz)   LMP 2023 (Approximate) Comment: missed AB  BMI 25.62 kg/m²     Ultrasound performed. See viewpoint for full ultrasound report.  A pride living IUP is identified.   Fetal size is appropriate for established dating.   No fetal structural malformations are identified; however, the anatomic survey is incomplete as noted above. The patient will be scheduled for a f/u exam to complete the anatomic survey.   Cervix was not well visualized abdominally and TVU showed normal cervical length measuring 4.4cm.  Placental location is posterior without evidence of previa.   A small anterior fibroid is present measuring about 1 x 2cm.     ASSESSMENT/PLAN:     29 y.o.  female with IUP at 20w0d     - Hypothyroidism " affecting pregnancy in first trimester  She has a history of thyroid cancer with subsequent thyroidectomy. She has been having labile TSH values for several weeks. She had a consult with endocrinologist, Dr Madrigal.   Thyroid requirements increase in pregnancy due to increases in metabolism and thyroid binding globulin. Thyroid function tests should be followed closely to ensure adequate treatment.  Hypothyroidism has been associated with higher pregnancy complication rates including miscarriage, preeclampsia, abruption, low birth weight and stillbirth. Untreated hypothyroidism has also been associated with adverse fetal neurological development, but well treated hypothyroidism (i.e., euthyroid state) carries an excellent prognosis for mother and baby.      Most recent TFTs (6/14/24)-   Showed slightly suppressed TSH.   Synthroid was adjusted at that time by endocrinology. Repeat labs due now.    Recommendations:  Follow fetal growth in the third trimester  Medication management:  Continue Levothyroxine at the current dose of 175 micrograms/day   Up to 1/3 of women may require increased hormone replacement in the first trimester, thus consider an empiric 25% increase in medication dose at pregnancy confirmation, while discharging postpartum patients on their pre-pregnancy dose.    Laboratory evaluation:   TSH should be monitored at least every trimester, or every 4 weeks after any medication adjustment. Adjust treatment as necessary to maintain TSH goal < 2.5 mIU/L.        FOLLOW UP:   F/u in 4 weeks for US/MFM visit (possible last MFM visit)    Ashley Michelle MD  Maternal Fetal Medicine

## 2024-07-23 ENCOUNTER — PATIENT MESSAGE (OUTPATIENT)
Dept: ENDOCRINOLOGY | Facility: CLINIC | Age: 29
End: 2024-07-23
Payer: COMMERCIAL

## 2024-08-15 DIAGNOSIS — Z85.850 HISTORY OF THYROID CANCER: Primary | ICD-10-CM

## 2024-08-19 ENCOUNTER — OFFICE VISIT (OUTPATIENT)
Dept: MATERNAL FETAL MEDICINE | Facility: CLINIC | Age: 29
End: 2024-08-19
Payer: COMMERCIAL

## 2024-08-19 ENCOUNTER — PROCEDURE VISIT (OUTPATIENT)
Dept: MATERNAL FETAL MEDICINE | Facility: CLINIC | Age: 29
End: 2024-08-19
Payer: COMMERCIAL

## 2024-08-19 VITALS
DIASTOLIC BLOOD PRESSURE: 71 MMHG | WEIGHT: 150.13 LBS | BODY MASS INDEX: 26.6 KG/M2 | SYSTOLIC BLOOD PRESSURE: 122 MMHG | HEART RATE: 80 BPM | HEIGHT: 63 IN

## 2024-08-19 DIAGNOSIS — Z85.850 HISTORY OF THYROID CANCER: ICD-10-CM

## 2024-08-19 DIAGNOSIS — E03.9 HYPOTHYROIDISM AFFECTING PREGNANCY IN SECOND TRIMESTER: Primary | ICD-10-CM

## 2024-08-19 DIAGNOSIS — O99.282 HYPOTHYROIDISM AFFECTING PREGNANCY IN SECOND TRIMESTER: Primary | ICD-10-CM

## 2024-08-19 PROCEDURE — 3078F DIAST BP <80 MM HG: CPT | Mod: CPTII,S$GLB,, | Performed by: OBSTETRICS & GYNECOLOGY

## 2024-08-19 PROCEDURE — 99213 OFFICE O/P EST LOW 20 MIN: CPT | Mod: S$GLB,,, | Performed by: OBSTETRICS & GYNECOLOGY

## 2024-08-19 PROCEDURE — 3008F BODY MASS INDEX DOCD: CPT | Mod: CPTII,S$GLB,, | Performed by: OBSTETRICS & GYNECOLOGY

## 2024-08-19 PROCEDURE — 3074F SYST BP LT 130 MM HG: CPT | Mod: CPTII,S$GLB,, | Performed by: OBSTETRICS & GYNECOLOGY

## 2024-08-19 PROCEDURE — 76816 OB US FOLLOW-UP PER FETUS: CPT | Mod: S$GLB,,, | Performed by: OBSTETRICS & GYNECOLOGY

## 2024-08-19 PROCEDURE — 1160F RVW MEDS BY RX/DR IN RCRD: CPT | Mod: CPTII,S$GLB,, | Performed by: OBSTETRICS & GYNECOLOGY

## 2024-08-19 PROCEDURE — 1159F MED LIST DOCD IN RCRD: CPT | Mod: CPTII,S$GLB,, | Performed by: OBSTETRICS & GYNECOLOGY

## 2024-08-19 RX ORDER — FERRIC MALTOL 30 MG/1
1 CAPSULE ORAL 2 TIMES DAILY
COMMUNITY
Start: 2024-08-16

## 2024-08-19 NOTE — PROGRESS NOTES
"Maternal Fetal Medicine follow up consult    SUBJECTIVE:     Carina Tamayo is a 29 y.o.  female with IUP at 24w4d who is seen in follow up consultation by [facility].  Pregnancy complications include:   Problem   - Hypothyroidism affecting pregnancy in second trimester     Carina presents for routine follow up appointment.  Most recent TFTs on 24 with Dr Madrigal. Plan for repeat labs with Jaida this week.  Denies LOF, VB, contractions. Positive fetal movement.  She passed her glucose test and was found to have iron deficiency anemia (Jaida). She has been given rx for iron but also has constipation issues. We discussed constipation today and OK to take iron QOD.     Previous notes reviewed.   No changes to medical, surgical, family, social, or obstetric history.  Interval history since last M visit: see above  Medications reviewed.    Care team members:  Dr العراقي - Primary OB     OBJECTIVE:   /71 (BP Location: Right arm)   Pulse 80   Ht 5' 3" (1.6 m)   Wt 68.1 kg (150 lb 2.1 oz)   LMP 2023 (Approximate) Comment: missed AB  BMI 26.59 kg/m²     Ultrasound performed. See viewpoint for full ultrasound report.    A viable pride pregnancy is visualized in breech presentation.  Estimated fetal weight is at the 30th percentile with an abdominal circumference at the 28th percentile.    No fetal abnormalities are noted and anatomic survey remains suboptimal for septum only. Amniotic fluid volume is normal.  Placenta is posterior.    ASSESSMENT/PLAN:     29 y.o.  female with IUP at 24w4d    - Hypothyroidism affecting pregnancy in second trimester  She has a history of thyroid cancer with subsequent thyroidectomy. She has been having labile TSH values for several weeks. She had a consult with endocrinologist, Dr Madrigal.   Thyroid requirements increase in pregnancy due to increases in metabolism and thyroid binding globulin. Thyroid function tests should be followed closely to ensure adequate " treatment.  Hypothyroidism has been associated with higher pregnancy complication rates including miscarriage, preeclampsia, abruption, low birth weight and stillbirth. Untreated hypothyroidism has also been associated with adverse fetal neurological development, but well treated hypothyroidism (i.e., euthyroid state) carries an excellent prognosis for mother and baby.      TFTs (6/14/24)-   Showed slightly suppressed TSH.   Synthroid was adjusted at that time by endocrinology. Repeat labs due now.  7/22/24- TSH 0.327, FT4 1.92. Taking Levothyroxine 175mcg once daily Mon-Sat; 1/2 pill on Sundays per Dr Madrigal. Repeat labs due in one week - orders placed by Dr Madrigal.    Recommendations:  Follow fetal growth in the third trimester  Medication management:  Continue Levothyroxine at the current dose of 175 micrograms/day Mon- Sat; 1/2 pill on Sundays  Up to 1/3 of women may require increased hormone replacement in the first trimester, thus consider an empiric 25% increase in medication dose at pregnancy confirmation, while discharging postpartum patients on their pre-pregnancy dose.    Laboratory evaluation:   TSH should be monitored at least every trimester, or every 4 weeks after any medication adjustment. Adjust treatment as necessary to maintain TSH goal < 2.5 mIU/L.      F/u in 4 weeks for MFM visit /growth ultrasound    Ashley Michelle MD  Maternal Fetal Medicine

## 2024-08-19 NOTE — ASSESSMENT & PLAN NOTE
She has a history of thyroid cancer with subsequent thyroidectomy. She has been having labile TSH values for several weeks. She had a consult with endocrinologist, Dr Madrigal.   Thyroid requirements increase in pregnancy due to increases in metabolism and thyroid binding globulin. Thyroid function tests should be followed closely to ensure adequate treatment.  Hypothyroidism has been associated with higher pregnancy complication rates including miscarriage, preeclampsia, abruption, low birth weight and stillbirth. Untreated hypothyroidism has also been associated with adverse fetal neurological development, but well treated hypothyroidism (i.e., euthyroid state) carries an excellent prognosis for mother and baby.      TFTs (6/14/24)-   Showed slightly suppressed TSH.   Synthroid was adjusted at that time by endocrinology. Repeat labs due now.  7/22/24- TSH 0.327, FT4 1.92. Taking Levothyroxine 175mcg once daily Mon-Sat; 1/2 pill on Sundays per Dr Madrigal. Repeat labs due in one week - orders placed by Dr Madrigal.    Recommendations:  Follow fetal growth in the third trimester  Medication management:  Continue Levothyroxine at the current dose of 175 micrograms/day Mon- Sat; 1/2 pill on Sundays  Up to 1/3 of women may require increased hormone replacement in the first trimester, thus consider an empiric 25% increase in medication dose at pregnancy confirmation, while discharging postpartum patients on their pre-pregnancy dose.    Laboratory evaluation:   TSH should be monitored at least every trimester, or every 4 weeks after any medication adjustment. Adjust treatment as necessary to maintain TSH goal < 2.5 mIU/L.

## 2024-08-20 ENCOUNTER — PATIENT MESSAGE (OUTPATIENT)
Dept: ENDOCRINOLOGY | Facility: CLINIC | Age: 29
End: 2024-08-20
Payer: COMMERCIAL

## 2024-08-20 DIAGNOSIS — E89.0 POSTSURGICAL HYPOTHYROIDISM: Primary | ICD-10-CM

## 2024-08-30 RX ORDER — LEVOTHYROXINE SODIUM 175 UG/1
175 TABLET ORAL DAILY
Qty: 30 TABLET | Refills: 6 | Status: SHIPPED | OUTPATIENT
Start: 2024-08-30

## 2024-08-30 NOTE — TELEPHONE ENCOUNTER
levothyroxine 175 mcg one pill daily except on Sundays take 1/2 pill     Lab Results   Component Value Date    TSH 0.172 (L) 06/14/2024

## 2024-09-19 DIAGNOSIS — Z85.850 HISTORY OF THYROID CANCER: Primary | ICD-10-CM

## 2024-09-23 ENCOUNTER — OFFICE VISIT (OUTPATIENT)
Dept: MATERNAL FETAL MEDICINE | Facility: CLINIC | Age: 29
End: 2024-09-23
Payer: COMMERCIAL

## 2024-09-23 ENCOUNTER — PROCEDURE VISIT (OUTPATIENT)
Dept: MATERNAL FETAL MEDICINE | Facility: CLINIC | Age: 29
End: 2024-09-23
Payer: COMMERCIAL

## 2024-09-23 VITALS
HEIGHT: 63 IN | BODY MASS INDEX: 27.65 KG/M2 | SYSTOLIC BLOOD PRESSURE: 108 MMHG | DIASTOLIC BLOOD PRESSURE: 72 MMHG | HEART RATE: 96 BPM | WEIGHT: 156.06 LBS

## 2024-09-23 DIAGNOSIS — O99.282 HYPOTHYROIDISM AFFECTING PREGNANCY IN SECOND TRIMESTER: ICD-10-CM

## 2024-09-23 DIAGNOSIS — Z85.850 HISTORY OF THYROID CANCER: ICD-10-CM

## 2024-09-23 DIAGNOSIS — E03.9 HYPOTHYROIDISM AFFECTING PREGNANCY IN SECOND TRIMESTER: ICD-10-CM

## 2024-09-23 NOTE — ASSESSMENT & PLAN NOTE
"She has a history of thyroid cancer with subsequent thyroidectomy. She has been having labile TSH values for several weeks. She had a consult with endocrinologist, Dr Madrigal.   Thyroid requirements increase in pregnancy due to increases in metabolism and thyroid binding globulin. Thyroid function tests should be followed closely to ensure adequate treatment.  Hypothyroidism has been associated with higher pregnancy complication rates including miscarriage, preeclampsia, abruption, low birth weight and stillbirth. Untreated hypothyroidism has also been associated with adverse fetal neurological development, but well treated hypothyroidism (i.e., euthyroid state) carries an excellent prognosis for mother and baby.      TFTs (6/14/24)-   Showed slightly suppressed TSH.   Synthroid was adjusted at that time by endocrinology. Repeat labs due now.  7/22/24- TSH 0.327, FT4 1.92. Taking Levothyroxine 175mcg once daily Mon-Sat; 1/2 pill on Sundays per Dr Madrigal. Repeat labs due in one week - orders placed by Dr Madrigal.  8/30/24- Meds adjusted by Dr Madrigal: "Reduce your levothyroxine from 175 mcg 6 1/2 pills weekly to levothyroxine 175 mcg one pill daily except on Sundays take no pill for a total of six pills weekly." TFTs re-entered to be reassessed in 2 weeks.  9/23/24- She has been having TFTs drawn through [facility] and then forwarded to Dr Madrigal.       Recommendations:  Follow fetal growth in the third trimester  Medication management:  Continue Levothyroxine at the current dose of 175 micrograms/day Mon- Sat; 1/2 pill on Sundays  Up to 1/3 of women may require increased hormone replacement in the first trimester, thus consider an empiric 25% increase in medication dose at pregnancy confirmation, while discharging postpartum patients on their pre-pregnancy dose.    Laboratory evaluation:   TSH should be monitored at least every trimester, or every 4 weeks after any medication adjustment. Adjust treatment as necessary to maintain " TSH goal < 2.5 mIU/L.

## 2024-09-23 NOTE — PROGRESS NOTES
"Maternal Fetal Medicine follow up consult    SUBJECTIVE:     Carina Tamayo is a 29 y.o.  female with IUP at 29w4d who is seen in follow up consultation by MFM.  Pregnancy complications include:   Problem   - Hypothyroidism affecting pregnancy in second trimester     Carina presents for routine follow up appointment.  Denies LOF, VB, contractions. Positive fetal movement.  Passed glucose test.     Previous notes reviewed.   No changes to medical, surgical, family, social, or obstetric history.  Interval history since last MFM visit: see above  Medications reviewed.    Care team members:  Dr العراقي - Primary OB     OBJECTIVE:   /72 (BP Location: Right arm)   Pulse 96   Ht 5' 3" (1.6 m)   Wt 70.8 kg (156 lb 1.4 oz)   LMP 2023 (Approximate) Comment: missed AB  BMI 27.65 kg/m²     Ultrasound performed. See viewpoint for full ultrasound report.    A viable pride pregnancy is visualized in cephalic presentation.  Estimated fetal weight is at the 31st percentile with an abdominal circumference at the 34th percentile.    No fetal abnormalities are noted and anatomic survey is complete. Amniotic fluid volume is normal.  Placenta is posterior.    ASSESSMENT/PLAN:     29 y.o.  female with IUP at 29w4d    - Hypothyroidism affecting pregnancy in second trimester  She has a history of thyroid cancer with subsequent thyroidectomy. She has been having labile TSH values for several weeks. She had a consult with endocrinologist, Dr Madrigal.   Thyroid requirements increase in pregnancy due to increases in metabolism and thyroid binding globulin. Thyroid function tests should be followed closely to ensure adequate treatment.  Hypothyroidism has been associated with higher pregnancy complication rates including miscarriage, preeclampsia, abruption, low birth weight and stillbirth. Untreated hypothyroidism has also been associated with adverse fetal neurological development, but well treated hypothyroidism " "(i.e., euthyroid state) carries an excellent prognosis for mother and baby.      TFTs (6/14/24)-   Showed slightly suppressed TSH.   Synthroid was adjusted at that time by endocrinology. Repeat labs due now.  7/22/24- TSH 0.327, FT4 1.92. Taking Levothyroxine 175mcg once daily Mon-Sat; 1/2 pill on Sundays per Dr Madrigal. Repeat labs due in one week - orders placed by Dr Madrigal.  8/30/24- Meds adjusted by Dr Madrigal: "Reduce your levothyroxine from 175 mcg 6 1/2 pills weekly to levothyroxine 175 mcg one pill daily except on Sundays take no pill for a total of six pills weekly." TFTs re-entered to be reassessed in 2 weeks.  9/23/24- She has been having TFTs drawn through [facility] and then forwarded to Dr Madrigal.       Recommendations:  Follow fetal growth in the third trimester  Medication management:  Continue Levothyroxine at the current dose of 175 micrograms/day Mon- Sat; 1/2 pill on Sundays  Up to 1/3 of women may require increased hormone replacement in the first trimester, thus consider an empiric 25% increase in medication dose at pregnancy confirmation, while discharging postpartum patients on their pre-pregnancy dose.    Laboratory evaluation:   TSH should be monitored at least every trimester, or every 4 weeks after any medication adjustment. Adjust treatment as necessary to maintain TSH goal < 2.5 mIU/L.      F/u in 6-7 weeks for MFM visit /growth ultrasound       Ashley Michelle MD  Maternal Fetal Medicine         "

## 2024-10-01 ENCOUNTER — PATIENT MESSAGE (OUTPATIENT)
Dept: ENDOCRINOLOGY | Facility: CLINIC | Age: 29
End: 2024-10-01
Payer: COMMERCIAL

## 2024-10-04 ENCOUNTER — APPOINTMENT (OUTPATIENT)
Dept: LAB | Facility: HOSPITAL | Age: 29
End: 2024-10-04
Attending: INTERNAL MEDICINE
Payer: COMMERCIAL

## 2024-10-04 ENCOUNTER — PATIENT MESSAGE (OUTPATIENT)
Dept: ENDOCRINOLOGY | Facility: CLINIC | Age: 29
End: 2024-10-04
Payer: COMMERCIAL

## 2024-10-04 DIAGNOSIS — Z01.818 OTHER SPECIFIED PRE-OPERATIVE EXAMINATION: Primary | ICD-10-CM

## 2024-10-04 DIAGNOSIS — E89.0 POSTSURGICAL HYPOTHYROIDISM: Primary | ICD-10-CM

## 2024-10-04 NOTE — TELEPHONE ENCOUNTER
Results for orders placed or performed in visit on 10/04/24   TSH    Collection Time: 10/04/24 11:20 AM   Result Value Ref Range    TSH 8.522 (H) 0.350 - 4.940 uIU/mL   T4, Free    Collection Time: 10/04/24 11:20 AM   Result Value Ref Range    Thyroxine Free 0.90 0.70 - 1.48 ng/dL   [facility] labs   Levothyroxine 175 mcg 6 pills weekly.   No recent illness  Feeling well, little tired.    PLAN:  Increase LT4 to 175 mcg one pill daily   Repeat TSH in two weeks.

## 2024-10-18 ENCOUNTER — LAB VISIT (OUTPATIENT)
Dept: LAB | Facility: HOSPITAL | Age: 29
End: 2024-10-18
Attending: INTERNAL MEDICINE
Payer: COMMERCIAL

## 2024-10-18 DIAGNOSIS — Z01.818 OTHER SPECIFIED PRE-OPERATIVE EXAMINATION: Primary | ICD-10-CM

## 2024-10-18 LAB
T4 FREE SERPL-MCNC: 1.21 NG/DL (ref 0.7–1.48)
TSH SERPL-ACNC: 2.21 UIU/ML (ref 0.35–4.94)

## 2024-10-18 PROCEDURE — 36415 COLL VENOUS BLD VENIPUNCTURE: CPT

## 2024-10-28 DIAGNOSIS — E89.0 POSTSURGICAL HYPOTHYROIDISM: Primary | ICD-10-CM

## 2024-10-31 DIAGNOSIS — E03.9 HYPOTHYROIDISM AFFECTING PREGNANCY IN THIRD TRIMESTER: ICD-10-CM

## 2024-10-31 DIAGNOSIS — Z85.850 HISTORY OF THYROID CANCER: Primary | ICD-10-CM

## 2024-10-31 DIAGNOSIS — O99.283 HYPOTHYROIDISM AFFECTING PREGNANCY IN THIRD TRIMESTER: ICD-10-CM

## 2024-11-04 ENCOUNTER — OFFICE VISIT (OUTPATIENT)
Dept: MATERNAL FETAL MEDICINE | Facility: CLINIC | Age: 29
End: 2024-11-04
Payer: COMMERCIAL

## 2024-11-04 ENCOUNTER — PROCEDURE VISIT (OUTPATIENT)
Dept: MATERNAL FETAL MEDICINE | Facility: CLINIC | Age: 29
End: 2024-11-04
Payer: COMMERCIAL

## 2024-11-04 VITALS
HEIGHT: 63 IN | DIASTOLIC BLOOD PRESSURE: 76 MMHG | SYSTOLIC BLOOD PRESSURE: 117 MMHG | WEIGHT: 163.5 LBS | BODY MASS INDEX: 28.97 KG/M2 | HEART RATE: 97 BPM

## 2024-11-04 DIAGNOSIS — E03.9 HYPOTHYROIDISM AFFECTING PREGNANCY IN SECOND TRIMESTER: Primary | ICD-10-CM

## 2024-11-04 DIAGNOSIS — E03.9 HYPOTHYROIDISM AFFECTING PREGNANCY IN THIRD TRIMESTER: Primary | ICD-10-CM

## 2024-11-04 DIAGNOSIS — Z85.850 HISTORY OF THYROID CANCER: ICD-10-CM

## 2024-11-04 DIAGNOSIS — O99.282 HYPOTHYROIDISM AFFECTING PREGNANCY IN SECOND TRIMESTER: Primary | ICD-10-CM

## 2024-11-04 DIAGNOSIS — E03.9 HYPOTHYROIDISM AFFECTING PREGNANCY IN THIRD TRIMESTER: ICD-10-CM

## 2024-11-04 DIAGNOSIS — O99.283 HYPOTHYROIDISM AFFECTING PREGNANCY IN THIRD TRIMESTER: ICD-10-CM

## 2024-11-04 DIAGNOSIS — O99.283 HYPOTHYROIDISM AFFECTING PREGNANCY IN THIRD TRIMESTER: Primary | ICD-10-CM

## 2024-11-04 NOTE — PROGRESS NOTES
"Maternal Fetal Medicine follow up consult    SUBJECTIVE:     Carina Tamayo is a 29 y.o.  female with IUP at 35w4d who is seen in follow up consultation by MFM.  Pregnancy complications include:   Problem   - Hypothyroidism affecting pregnancy in second trimester       Carina presents for routine follow up appointment.  Nearing full term.   Denies LOF, VB, contractions. Positive fetal movement.  She has been following TSH/ T4 with Dr. Madrigal. Next draw is postpartum.     Previous notes reviewed.   No changes to medical, surgical, family, social, or obstetric history.  Interval history since last MFM visit: see above  Medications reviewed.    Care team members:  Dr العراقي - Primary OB     OBJECTIVE:   /76 (BP Location: Right arm, Patient Position: Sitting)   Pulse 97   Ht 5' 3" (1.6 m)   Wt 74.2 kg (163 lb 7.5 oz)   LMP 2023 (Approximate) Comment: missed AB  BMI 28.96 kg/m²     Ultrasound performed. See viewpoint for full ultrasound report.    A viable pride pregnancy is visualized in cephalic presentation.  Estimated fetal weight is at the 15th percentile with an abdominal circumference at the 18th percentile.    No fetal abnormalities are noted and previous anatomic survey was normal. Amniotic fluid volume is normal.  Placenta is posterior. Biophysical profile is 8/8.     ASSESSMENT/PLAN:     29 y.o.  female with IUP at 35w4d    - Hypothyroidism affecting pregnancy in second trimester  She has a history of thyroid cancer with subsequent thyroidectomy. She has been having labile TSH values for several weeks. She had a consult with endocrinologist, Dr Madrigal.   Thyroid requirements increase in pregnancy due to increases in metabolism and thyroid binding globulin. Thyroid function tests should be followed closely to ensure adequate treatment.  Hypothyroidism has been associated with higher pregnancy complication rates including miscarriage, preeclampsia, abruption, low birth weight and " "stillbirth. Untreated hypothyroidism has also been associated with adverse fetal neurological development, but well treated hypothyroidism (i.e., euthyroid state) carries an excellent prognosis for mother and baby.      TFTs (6/14/24)-   Showed slightly suppressed TSH.   Synthroid was adjusted at that time by endocrinology. Repeat labs due now.  7/22/24- TSH 0.327, FT4 1.92. Taking Levothyroxine 175mcg once daily Mon-Sat; 1/2 pill on Sundays per Dr Madrigal. Repeat labs due in one week - orders placed by Dr Madrigal.  8/30/24- Meds adjusted by Dr Madrigal: "Reduce your levothyroxine from 175 mcg 6 1/2 pills weekly to levothyroxine 175 mcg one pill daily except on Sundays take no pill for a total of six pills weekly." TFTs re-entered to be reassessed in 2 weeks.  9/23/24- She has been having TFTs drawn through [facility] and then forwarded to Dr Madrigal.   11/4- Last TSH was elevated and synthroid adjusted to 175mcg daily. She has been advised for next draw postpartum.     Fetal growth is borderline FGR. We will reassess at 38 weeks to determine need for IOL versus expectant management.     Recommendations:  Follow fetal growth in the third trimester  Medication management:  Continue Levothyroxine at the current dose of 175 micrograms/day Mon- Sat; 1/2 pill on Sundays  Up to 1/3 of women may require increased hormone replacement in the first trimester, thus consider an empiric 25% increase in medication dose at pregnancy confirmation, while discharging postpartum patients on their pre-pregnancy dose.    Laboratory evaluation:   TSH should be monitored at least every trimester, or every 4 weeks after any medication adjustment. Adjust treatment as necessary to maintain TSH goal < 2.5 mIU/L.      F/u in 3 weeks for MFM visit /growth ultrasound       Ashley Michelle MD  Maternal Fetal Medicine           "

## 2024-11-04 NOTE — ASSESSMENT & PLAN NOTE
"She has a history of thyroid cancer with subsequent thyroidectomy. She has been having labile TSH values for several weeks. She had a consult with endocrinologist, Dr Madrigal.   Thyroid requirements increase in pregnancy due to increases in metabolism and thyroid binding globulin. Thyroid function tests should be followed closely to ensure adequate treatment.  Hypothyroidism has been associated with higher pregnancy complication rates including miscarriage, preeclampsia, abruption, low birth weight and stillbirth. Untreated hypothyroidism has also been associated with adverse fetal neurological development, but well treated hypothyroidism (i.e., euthyroid state) carries an excellent prognosis for mother and baby.      TFTs (6/14/24)-   Showed slightly suppressed TSH.   Synthroid was adjusted at that time by endocrinology. Repeat labs due now.  7/22/24- TSH 0.327, FT4 1.92. Taking Levothyroxine 175mcg once daily Mon-Sat; 1/2 pill on Sundays per Dr Madrigal. Repeat labs due in one week - orders placed by Dr Madrigal.  8/30/24- Meds adjusted by Dr Madrigal: "Reduce your levothyroxine from 175 mcg 6 1/2 pills weekly to levothyroxine 175 mcg one pill daily except on Sundays take no pill for a total of six pills weekly." TFTs re-entered to be reassessed in 2 weeks.  9/23/24- She has been having TFTs drawn through [facility] and then forwarded to Dr Madrigal.   11/4- Last TSH was elevated and synthroid adjusted to 175mcg daily. She has been advised for next draw postpartum.     Fetal growth is borderline FGR. We will reassess at 38 weeks to determine need for IOL versus expectant management.     Recommendations:  Follow fetal growth in the third trimester  Medication management:  Continue Levothyroxine at the current dose of 175 micrograms/day Mon- Sat; 1/2 pill on Sundays  Up to 1/3 of women may require increased hormone replacement in the first trimester, thus consider an empiric 25% increase in medication dose at pregnancy confirmation, " while discharging postpartum patients on their pre-pregnancy dose.    Laboratory evaluation:   TSH should be monitored at least every trimester, or every 4 weeks after any medication adjustment. Adjust treatment as necessary to maintain TSH goal < 2.5 mIU/L.

## 2024-11-11 LAB — PRENATAL STREP B CULTURE: NEGATIVE

## 2024-11-25 ENCOUNTER — PROCEDURE VISIT (OUTPATIENT)
Dept: MATERNAL FETAL MEDICINE | Facility: CLINIC | Age: 29
End: 2024-11-25
Payer: COMMERCIAL

## 2024-11-25 ENCOUNTER — OFFICE VISIT (OUTPATIENT)
Dept: MATERNAL FETAL MEDICINE | Facility: CLINIC | Age: 29
End: 2024-11-25
Payer: COMMERCIAL

## 2024-11-25 VITALS
BODY MASS INDEX: 29.55 KG/M2 | TEMPERATURE: 98 F | RESPIRATION RATE: 20 BRPM | DIASTOLIC BLOOD PRESSURE: 83 MMHG | OXYGEN SATURATION: 98 % | HEART RATE: 83 BPM | WEIGHT: 166.75 LBS | SYSTOLIC BLOOD PRESSURE: 124 MMHG | HEIGHT: 63 IN

## 2024-11-25 DIAGNOSIS — O99.283 HYPOTHYROIDISM AFFECTING PREGNANCY IN THIRD TRIMESTER: Primary | ICD-10-CM

## 2024-11-25 DIAGNOSIS — Z85.850 HISTORY OF THYROID CANCER: ICD-10-CM

## 2024-11-25 DIAGNOSIS — E03.9 HYPOTHYROIDISM AFFECTING PREGNANCY IN THIRD TRIMESTER: Primary | ICD-10-CM

## 2024-11-25 DIAGNOSIS — O99.283 HYPOTHYROIDISM AFFECTING PREGNANCY IN THIRD TRIMESTER: ICD-10-CM

## 2024-11-25 DIAGNOSIS — E03.9 HYPOTHYROIDISM AFFECTING PREGNANCY IN THIRD TRIMESTER: ICD-10-CM

## 2024-11-25 NOTE — PROGRESS NOTES
"Maternal Fetal Medicine follow up consult    SUBJECTIVE:     Carina Tamayo is a 29 y.o.  female with IUP at 38w4d who is seen in follow up consultation by MFM.  Pregnancy complications include:   Problem   - Hypothyroidism affecting pregnancy in third trimester     Carina presents for routine follow up appointment.  Denies LOF, VB, contractions. Positive fetal movement.  IOL scheduled .    Previous notes reviewed.   No changes to medical, surgical, family, social, or obstetric history.  Interval history since last MFM visit: see above  Medications reviewed.    Care team members:  Dr العراقي - Primary OB     OBJECTIVE:   /83   Pulse 83   Temp 98.1 °F (36.7 °C) (Oral)   Resp 20   Ht 5' 3" (1.6 m)   Wt 75.7 kg (166 lb 12.5 oz)   LMP 2023 (Approximate) Comment: missed AB  SpO2 98%   BMI 29.54 kg/m²     Ultrasound performed. See viewpoint for full ultrasound report.    A viable pride pregnancy is visualized in cephalic presentation.  Estimated fetal weight is at the 33rd percentile with an abdominal circumference at the 47th percentile.    No fetal abnormalities are noted and previous anatomic survey was normal. Amniotic fluid volume is normal.  Placenta is posterior. Biophysical profile is 8/8.     ASSESSMENT/PLAN:     29 y.o.  female with IUP at 38w4d    - Hypothyroidism affecting pregnancy in third trimester  She has a history of thyroid cancer with subsequent thyroidectomy. She has been having labile TSH values for several weeks. She had a consult with endocrinologist, Dr Madrigal.   Thyroid requirements increase in pregnancy due to increases in metabolism and thyroid binding globulin. Thyroid function tests should be followed closely to ensure adequate treatment.  Hypothyroidism has been associated with higher pregnancy complication rates including miscarriage, preeclampsia, abruption, low birth weight and stillbirth. Untreated hypothyroidism has also been associated with adverse " "fetal neurological development, but well treated hypothyroidism (i.e., euthyroid state) carries an excellent prognosis for mother and baby.      TFTs (6/14/24)-   Showed slightly suppressed TSH.   Synthroid was adjusted at that time by endocrinology. Repeat labs due now.  7/22/24- TSH 0.327, FT4 1.92. Taking Levothyroxine 175mcg once daily Mon-Sat; 1/2 pill on Sundays per Dr Madrigal. Repeat labs due in one week - orders placed by Dr Madrigal.  8/30/24- Meds adjusted by Dr Madrigal: "Reduce your levothyroxine from 175 mcg 6 1/2 pills weekly to levothyroxine 175 mcg one pill daily except on Sundays take no pill for a total of six pills weekly." TFTs re-entered to be reassessed in 2 weeks.  9/23/24- She has been having TFTs drawn through [facility] and then forwarded to Dr Madrigal.   11/4- Last TSH was elevated and synthroid adjusted to 175mcg daily. She has been advised for next draw postpartum.     Recommendations:  Follow fetal growth in the third trimester  Medication management:  Continue Levothyroxine at the current dose of 175 micrograms/day Mon- Sat; 1/2 pill on Sundays  Up to 1/3 of women may require increased hormone replacement in the first trimester, thus consider an empiric 25% increase in medication dose at pregnancy confirmation, while discharging postpartum patients on their pre-pregnancy dose.    Laboratory evaluation:   TSH should be monitored at least every trimester, or every 4 weeks after any medication adjustment. Adjust treatment as necessary to maintain TSH goal < 2.5 mIU/L.      We have not scheduled any follow-up with [facility] at this time, but would be happy to see her again should any questions, concerns, or issues arise.      Ashley Michelle MD  Maternal Fetal Medicine         "

## 2024-11-25 NOTE — ASSESSMENT & PLAN NOTE
"She has a history of thyroid cancer with subsequent thyroidectomy. She has been having labile TSH values for several weeks. She had a consult with endocrinologist, Dr Madrigal.   Thyroid requirements increase in pregnancy due to increases in metabolism and thyroid binding globulin. Thyroid function tests should be followed closely to ensure adequate treatment.  Hypothyroidism has been associated with higher pregnancy complication rates including miscarriage, preeclampsia, abruption, low birth weight and stillbirth. Untreated hypothyroidism has also been associated with adverse fetal neurological development, but well treated hypothyroidism (i.e., euthyroid state) carries an excellent prognosis for mother and baby.      TFTs (6/14/24)-   Showed slightly suppressed TSH.   Synthroid was adjusted at that time by endocrinology. Repeat labs due now.  7/22/24- TSH 0.327, FT4 1.92. Taking Levothyroxine 175mcg once daily Mon-Sat; 1/2 pill on Sundays per Dr Madrigal. Repeat labs due in one week - orders placed by Dr Madrigal.  8/30/24- Meds adjusted by Dr Madrigal: "Reduce your levothyroxine from 175 mcg 6 1/2 pills weekly to levothyroxine 175 mcg one pill daily except on Sundays take no pill for a total of six pills weekly." TFTs re-entered to be reassessed in 2 weeks.  9/23/24- She has been having TFTs drawn through [facility] and then forwarded to Dr Madrigal.   11/4- Last TSH was elevated and synthroid adjusted to 175mcg daily. She has been advised for next draw postpartum.     Recommendations:  Follow fetal growth in the third trimester  Medication management:  Continue Levothyroxine at the current dose of 175 micrograms/day Mon- Sat; 1/2 pill on Sundays  Up to 1/3 of women may require increased hormone replacement in the first trimester, thus consider an empiric 25% increase in medication dose at pregnancy confirmation, while discharging postpartum patients on their pre-pregnancy dose.    Laboratory evaluation:   TSH should be monitored at " least every trimester, or every 4 weeks after any medication adjustment. Adjust treatment as necessary to maintain TSH goal < 2.5 mIU/L.

## 2024-12-02 ENCOUNTER — HOSPITAL ENCOUNTER (EMERGENCY)
Facility: HOSPITAL | Age: 29
Discharge: HOME | End: 2024-12-02
Payer: COMMERCIAL

## 2024-12-02 VITALS
WEIGHT: 170 LBS | RESPIRATION RATE: 16 BRPM | BODY MASS INDEX: 30.12 KG/M2 | OXYGEN SATURATION: 100 % | HEIGHT: 63 IN | TEMPERATURE: 98 F

## 2024-12-02 LAB
CTP QC/QA: YES
RUPTURE OF MEMBRANE: NEGATIVE

## 2024-12-02 PROCEDURE — 99284 EMERGENCY DEPT VISIT MOD MDM: CPT

## 2024-12-02 PROCEDURE — 84112 EVAL AMNIOTIC FLUID PROTEIN: CPT

## 2024-12-02 NOTE — ED PROVIDER NOTES
"       KEENAN NOTE  [facility]     Admit Date: 2024  KEENAN Physician: Isaura Vieira  Primary OBGYN: Dr. العراقي    Admit Diagnosis/Chief Complaint:  LOF x2 episodes  Discharge Diagnosis:  suspected ROM not found    Chief Complaint   Patient presents with    Rupture of Membranes     Gush around 12am and 3am       Hospital Course:  Carina Tamayo is a 29 y.o.  at 39w4d presents complaining of leaking fluid around midnight and again 3am. Both times when using the restroom. No continued leaking, noted the fluid to be clear.  This IUP is complicated by hypothyroidism on synthroid and BMI 30.    Patient denies vaginal bleeding, contractions, headache, vision changes, RUQ pain, dysuria, fever, and nausea/vomiting.  Fetal Movement: normal.    Temp 98.2 °F (36.8 °C) (Oral)   Resp 16   Ht 5' 3" (1.6 m)   Wt 77.1 kg (170 lb)   LMP 2023 (Approximate) Comment: missed AB  SpO2 100%   Breastfeeding No   BMI 30.11 kg/m²   Temp:  [98.2 °F (36.8 °C)] 98.2 °F (36.8 °C)  Resp:  [16] 16  SpO2:  [100 %] 100 %    General: in no apparent distress in no respiratory distress and acyanotic alert oriented times 3 afebrile  Cardiovascular: regular rate and rhythm no murmurs  Respiratory: unlabored  Abdominal: soft, nontender, nondistended, no abnormal masses, no epigastric pain FHT present  Back: lumbar tenderness absent CVA tenderness none suprapubic tenderness absent  Extremeties no redness or tenderness in the calves or thighs no edema    SVE (PeriWATCH)  Dilation (cm): 1  Effacement (%): 40  Station: -3  Cervical Position: Posterior  Examined by:: Isaura Vieira  Simplified Britt Score: 2         EFM: Cat 1, 120 modBTV, +accel, no decel (reassuring, reactive)  TOCO: no ctx      Medical Decision Making:      LABS:   No results found for this or any previous visit (from the past 24 hours).    Imaging Results    None          ASSESMENT and clinical impression: Carina Tamayo is a 29 y.o. "   at 39w4d with suspected ROM not found (negative ROM plus)    Discharge Diagnosis/clinical impression:   Patient Active Problem List   Diagnosis    Missed  history of    Recurrent pregnancy loss    Postsurgical hypothyroidism    First trimester pregnancy    - Hypothyroidism affecting pregnancy in third trimester       Status:Stable    Disposition:  discharged to home    Medications:   none    Patient Instructions:   - Pt was given routine pregnancy instructions including to return to triage if she had any vaginal bleeding (other than spotting for the next 48hrs), any loss of fluid like her water broke, decreased fetal movement, or contractions Q 5min lasting for 2 or more hours. Pt was also instructed to drink copious water. Patient voiced understanding of all these instructions and was subsequently discharged home. Tylenol use and maternity belt use discussed. All questions answered. Pt left KEENAN with good understanding of plan.   Preeclampsia/ROM/labor/fever/decreased FM with [facility] precautions discussed, voiced understanding     She will follow up with her primary OB as scheduled at 3pm today and has IOL scheduled in 2 days    Isaura Vieira MD  OB/GYN Hospitalist  5:46 AM 2024

## 2024-12-04 ENCOUNTER — HOSPITAL ENCOUNTER (INPATIENT)
Facility: HOSPITAL | Age: 29
LOS: 2 days | Discharge: HOME | End: 2024-12-06
Attending: OBSTETRICS & GYNECOLOGY | Admitting: OBSTETRICS & GYNECOLOGY
Payer: COMMERCIAL

## 2024-12-04 DIAGNOSIS — Z3A.39 39 WEEKS GESTATION OF PREGNANCY: Primary | ICD-10-CM

## 2024-12-04 DIAGNOSIS — Z3A.39 39 WEEKS GESTATION OF PREGNANCY: ICD-10-CM

## 2024-12-04 LAB
BASOPHILS # BLD AUTO: 0.04 X10(3)/MCL
BASOPHILS NFR BLD AUTO: 0.6 %
EOSINOPHIL # BLD AUTO: 0.07 X10(3)/MCL (ref 0–0.9)
EOSINOPHIL NFR BLD AUTO: 1 %
ERYTHROCYTE [DISTWIDTH] IN BLOOD BY AUTOMATED COUNT: 13.3 % (ref 11.5–17)
GROUP & RH: NORMAL
HCT VFR BLD AUTO: 30 % (ref 37–47)
HGB BLD-MCNC: 9.6 G/DL (ref 12–16)
HIV 1+2 AB+HIV1 P24 AG SERPL QL IA: NONREACTIVE
IMM GRANULOCYTES # BLD AUTO: 0.04 X10(3)/MCL (ref 0–0.04)
IMM GRANULOCYTES NFR BLD AUTO: 0.6 %
INDIRECT COOMBS: NORMAL
LYMPHOCYTES # BLD AUTO: 1.34 X10(3)/MCL (ref 0.6–4.6)
LYMPHOCYTES NFR BLD AUTO: 18.4 %
MCH RBC QN AUTO: 27.4 PG (ref 27–31)
MCHC RBC AUTO-ENTMCNC: 32 G/DL (ref 33–36)
MCV RBC AUTO: 85.5 FL (ref 80–94)
MONOCYTES # BLD AUTO: 0.55 X10(3)/MCL (ref 0.1–1.3)
MONOCYTES NFR BLD AUTO: 7.6 %
NEUTROPHILS # BLD AUTO: 5.23 X10(3)/MCL (ref 2.1–9.2)
NEUTROPHILS NFR BLD AUTO: 71.8 %
NRBC BLD AUTO-RTO: 0 %
PLATELET # BLD AUTO: 173 X10(3)/MCL (ref 130–400)
PMV BLD AUTO: 11.4 FL (ref 7.4–10.4)
RBC # BLD AUTO: 3.51 X10(6)/MCL (ref 4.2–5.4)
SPECIMEN OUTDATE: NORMAL
T PALLIDUM AB SER QL: NONREACTIVE
WBC # BLD AUTO: 7.27 X10(3)/MCL (ref 4.5–11.5)

## 2024-12-04 PROCEDURE — 86780 TREPONEMA PALLIDUM: CPT | Performed by: OBSTETRICS & GYNECOLOGY

## 2024-12-04 PROCEDURE — 11000001 HC ACUTE MED/SURG PRIVATE ROOM

## 2024-12-04 PROCEDURE — 86901 BLOOD TYPING SEROLOGIC RH(D): CPT | Performed by: OBSTETRICS & GYNECOLOGY

## 2024-12-04 PROCEDURE — 25000003 PHARM REV CODE 250: Performed by: OBSTETRICS & GYNECOLOGY

## 2024-12-04 PROCEDURE — 85025 COMPLETE CBC W/AUTO DIFF WBC: CPT | Performed by: OBSTETRICS & GYNECOLOGY

## 2024-12-04 PROCEDURE — 87389 HIV-1 AG W/HIV-1&-2 AB AG IA: CPT | Performed by: OBSTETRICS & GYNECOLOGY

## 2024-12-04 RX ORDER — LIDOCAINE HYDROCHLORIDE 10 MG/ML
10 INJECTION, SOLUTION INFILTRATION; PERINEURAL ONCE AS NEEDED
Status: DISCONTINUED | OUTPATIENT
Start: 2024-12-04 | End: 2024-12-06 | Stop reason: HOSPADM

## 2024-12-04 RX ORDER — MISOPROSTOL 100 UG/1
800 TABLET ORAL ONCE AS NEEDED
OUTPATIENT
Start: 2024-12-04 | End: 2036-05-02

## 2024-12-04 RX ORDER — MUPIROCIN 20 MG/G
OINTMENT TOPICAL
Status: DISCONTINUED | OUTPATIENT
Start: 2024-12-04 | End: 2024-12-05

## 2024-12-04 RX ORDER — OXYTOCIN-SODIUM CHLORIDE 0.9% IV SOLN 30 UNIT/500ML 30-0.9/5 UT/ML-%
95 SOLUTION INTRAVENOUS CONTINUOUS PRN
OUTPATIENT
Start: 2024-12-04

## 2024-12-04 RX ORDER — OXYTOCIN-SODIUM CHLORIDE 0.9% IV SOLN 30 UNIT/500ML 30-0.9/5 UT/ML-%
95 SOLUTION INTRAVENOUS ONCE AS NEEDED
Status: CANCELLED | OUTPATIENT
Start: 2024-12-04 | End: 2036-05-02

## 2024-12-04 RX ORDER — SODIUM CHLORIDE, SODIUM LACTATE, POTASSIUM CHLORIDE, CALCIUM CHLORIDE 600; 310; 30; 20 MG/100ML; MG/100ML; MG/100ML; MG/100ML
INJECTION, SOLUTION INTRAVENOUS CONTINUOUS
Status: CANCELLED | OUTPATIENT
Start: 2024-12-04

## 2024-12-04 RX ORDER — BUTORPHANOL TARTRATE 2 MG/ML
1 INJECTION INTRAMUSCULAR; INTRAVENOUS
Status: CANCELLED | OUTPATIENT
Start: 2024-12-04

## 2024-12-04 RX ORDER — BUTORPHANOL TARTRATE 2 MG/ML
1 INJECTION INTRAMUSCULAR; INTRAVENOUS
Status: DISCONTINUED | OUTPATIENT
Start: 2024-12-04 | End: 2024-12-05

## 2024-12-04 RX ORDER — LIDOCAINE HYDROCHLORIDE 10 MG/ML
10 INJECTION, SOLUTION INFILTRATION; PERINEURAL ONCE AS NEEDED
Status: CANCELLED | OUTPATIENT
Start: 2024-12-04 | End: 2036-05-02

## 2024-12-04 RX ORDER — METHYLERGONOVINE MALEATE 0.2 MG/ML
200 INJECTION INTRAVENOUS ONCE AS NEEDED
OUTPATIENT
Start: 2024-12-04 | End: 2036-05-02

## 2024-12-04 RX ORDER — MUPIROCIN 20 MG/G
OINTMENT TOPICAL
Status: CANCELLED | OUTPATIENT
Start: 2024-12-04

## 2024-12-04 RX ORDER — OXYTOCIN 10 [USP'U]/ML
10 INJECTION, SOLUTION INTRAMUSCULAR; INTRAVENOUS ONCE AS NEEDED
OUTPATIENT
Start: 2024-12-04 | End: 2036-05-02

## 2024-12-04 RX ORDER — OXYTOCIN-SODIUM CHLORIDE 0.9% IV SOLN 30 UNIT/500ML 30-0.9/5 UT/ML-%
10 SOLUTION INTRAVENOUS ONCE AS NEEDED
Status: CANCELLED | OUTPATIENT
Start: 2024-12-04 | End: 2036-05-02

## 2024-12-04 RX ORDER — DIPHENOXYLATE HYDROCHLORIDE AND ATROPINE SULFATE 2.5; .025 MG/1; MG/1
2 TABLET ORAL EVERY 6 HOURS PRN
OUTPATIENT
Start: 2024-12-04

## 2024-12-04 RX ORDER — SIMETHICONE 80 MG
1 TABLET,CHEWABLE ORAL 4 TIMES DAILY PRN
Status: DISCONTINUED | OUTPATIENT
Start: 2024-12-04 | End: 2024-12-06 | Stop reason: HOSPADM

## 2024-12-04 RX ORDER — SIMETHICONE 80 MG
1 TABLET,CHEWABLE ORAL 4 TIMES DAILY PRN
Status: CANCELLED | OUTPATIENT
Start: 2024-12-04

## 2024-12-04 RX ORDER — OXYTOCIN-SODIUM CHLORIDE 0.9% IV SOLN 30 UNIT/500ML 30-0.9/5 UT/ML-%
95 SOLUTION INTRAVENOUS ONCE AS NEEDED
Status: DISCONTINUED | OUTPATIENT
Start: 2024-12-04 | End: 2024-12-05

## 2024-12-04 RX ORDER — SODIUM CHLORIDE, SODIUM LACTATE, POTASSIUM CHLORIDE, CALCIUM CHLORIDE 600; 310; 30; 20 MG/100ML; MG/100ML; MG/100ML; MG/100ML
INJECTION, SOLUTION INTRAVENOUS CONTINUOUS
Status: DISCONTINUED | OUTPATIENT
Start: 2024-12-04 | End: 2024-12-05

## 2024-12-04 RX ORDER — OXYTOCIN-SODIUM CHLORIDE 0.9% IV SOLN 30 UNIT/500ML 30-0.9/5 UT/ML-%
10 SOLUTION INTRAVENOUS ONCE AS NEEDED
Status: COMPLETED | OUTPATIENT
Start: 2024-12-04 | End: 2024-12-05

## 2024-12-04 RX ORDER — BUTORPHANOL TARTRATE 2 MG/ML
2 INJECTION INTRAMUSCULAR; INTRAVENOUS
Status: DISCONTINUED | OUTPATIENT
Start: 2024-12-04 | End: 2024-12-05

## 2024-12-04 RX ORDER — OXYTOCIN-SODIUM CHLORIDE 0.9% IV SOLN 30 UNIT/500ML 30-0.9/5 UT/ML-%
30 SOLUTION INTRAVENOUS ONCE AS NEEDED
Status: DISCONTINUED | OUTPATIENT
Start: 2024-12-04 | End: 2024-12-06 | Stop reason: HOSPADM

## 2024-12-04 RX ORDER — ACETAMINOPHEN 325 MG/1
650 TABLET ORAL EVERY 6 HOURS PRN
Status: DISCONTINUED | OUTPATIENT
Start: 2024-12-04 | End: 2024-12-06 | Stop reason: HOSPADM

## 2024-12-04 RX ORDER — MISOPROSTOL 100 UG/1
800 TABLET ORAL ONCE AS NEEDED
OUTPATIENT
Start: 2024-12-04

## 2024-12-04 RX ORDER — CARBOPROST TROMETHAMINE 250 UG/ML
250 INJECTION, SOLUTION INTRAMUSCULAR
OUTPATIENT
Start: 2024-12-04

## 2024-12-04 RX ORDER — ACETAMINOPHEN 325 MG/1
650 TABLET ORAL EVERY 6 HOURS PRN
Status: CANCELLED | OUTPATIENT
Start: 2024-12-04

## 2024-12-04 RX ORDER — ONDANSETRON 4 MG/1
8 TABLET, ORALLY DISINTEGRATING ORAL EVERY 8 HOURS PRN
Status: DISCONTINUED | OUTPATIENT
Start: 2024-12-04 | End: 2024-12-05

## 2024-12-04 RX ORDER — CALCIUM CARBONATE 200(500)MG
500 TABLET,CHEWABLE ORAL 3 TIMES DAILY PRN
Status: DISCONTINUED | OUTPATIENT
Start: 2024-12-04 | End: 2024-12-06 | Stop reason: HOSPADM

## 2024-12-04 RX ORDER — CALCIUM CARBONATE 200(500)MG
500 TABLET,CHEWABLE ORAL 3 TIMES DAILY PRN
Status: CANCELLED | OUTPATIENT
Start: 2024-12-04

## 2024-12-04 RX ORDER — OXYTOCIN-SODIUM CHLORIDE 0.9% IV SOLN 30 UNIT/500ML 30-0.9/5 UT/ML-%
30 SOLUTION INTRAVENOUS ONCE AS NEEDED
Status: CANCELLED | OUTPATIENT
Start: 2024-12-04 | End: 2036-05-02

## 2024-12-04 RX ORDER — ONDANSETRON 4 MG/1
8 TABLET, ORALLY DISINTEGRATING ORAL EVERY 8 HOURS PRN
Status: CANCELLED | OUTPATIENT
Start: 2024-12-04

## 2024-12-04 RX ORDER — BUTORPHANOL TARTRATE 2 MG/ML
2 INJECTION INTRAMUSCULAR; INTRAVENOUS
Status: CANCELLED | OUTPATIENT
Start: 2024-12-04

## 2024-12-04 RX ADMIN — DINOPROSTONE 10 MG: 10 INSERT VAGINAL at 09:12

## 2024-12-05 ENCOUNTER — ANESTHESIA EVENT (OUTPATIENT)
Dept: OBSTETRICS AND GYNECOLOGY | Facility: HOSPITAL | Age: 29
End: 2024-12-05
Payer: COMMERCIAL

## 2024-12-05 ENCOUNTER — ANESTHESIA (OUTPATIENT)
Dept: OBSTETRICS AND GYNECOLOGY | Facility: HOSPITAL | Age: 29
End: 2024-12-05
Payer: COMMERCIAL

## 2024-12-05 LAB
CTP QC/QA: YES
RUPTURE OF MEMBRANE: POSITIVE

## 2024-12-05 PROCEDURE — 25000003 PHARM REV CODE 250: Performed by: OBSTETRICS & GYNECOLOGY

## 2024-12-05 PROCEDURE — 25000003 PHARM REV CODE 250

## 2024-12-05 PROCEDURE — 0KQM0ZZ REPAIR PERINEUM MUSCLE, OPEN APPROACH: ICD-10-PCS | Performed by: OBSTETRICS & GYNECOLOGY

## 2024-12-05 PROCEDURE — 51702 INSERT TEMP BLADDER CATH: CPT

## 2024-12-05 PROCEDURE — 63600175 PHARM REV CODE 636 W HCPCS: Performed by: OBSTETRICS & GYNECOLOGY

## 2024-12-05 PROCEDURE — 72200005 HC VAGINAL DELIVERY LEVEL II

## 2024-12-05 PROCEDURE — 11000001 HC ACUTE MED/SURG PRIVATE ROOM

## 2024-12-05 PROCEDURE — 72100003 HC LABOR CARE, EA. ADDL. 8 HRS

## 2024-12-05 PROCEDURE — 72100002 HC LABOR CARE, 1ST 8 HOURS

## 2024-12-05 PROCEDURE — 62326 NJX INTERLAMINAR LMBR/SAC: CPT

## 2024-12-05 PROCEDURE — 3E0P7VZ INTRODUCTION OF HORMONE INTO FEMALE REPRODUCTIVE, VIA NATURAL OR ARTIFICIAL OPENING: ICD-10-PCS | Performed by: OBSTETRICS & GYNECOLOGY

## 2024-12-05 PROCEDURE — 63600175 PHARM REV CODE 636 W HCPCS

## 2024-12-05 RX ORDER — NALOXONE HCL 0.4 MG/ML
0.4 VIAL (ML) INJECTION SEE ADMIN INSTRUCTIONS
Status: DISCONTINUED | OUTPATIENT
Start: 2024-12-05 | End: 2024-12-06 | Stop reason: HOSPADM

## 2024-12-05 RX ORDER — OXYTOCIN 10 [USP'U]/ML
10 INJECTION, SOLUTION INTRAMUSCULAR; INTRAVENOUS ONCE AS NEEDED
Status: DISCONTINUED | OUTPATIENT
Start: 2024-12-05 | End: 2024-12-06 | Stop reason: HOSPADM

## 2024-12-05 RX ORDER — FENTANYL/BUPIVACAINE/NS/PF 2-1250MCG
PLASTIC BAG, INJECTION (ML) INJECTION CONTINUOUS
Status: DISCONTINUED | OUTPATIENT
Start: 2024-12-05 | End: 2024-12-05

## 2024-12-05 RX ORDER — SIMETHICONE 80 MG
1 TABLET,CHEWABLE ORAL EVERY 6 HOURS PRN
Status: DISCONTINUED | OUTPATIENT
Start: 2024-12-05 | End: 2024-12-06 | Stop reason: HOSPADM

## 2024-12-05 RX ORDER — FENTANYL CITRATE 50 UG/ML
INJECTION, SOLUTION INTRAMUSCULAR; INTRAVENOUS
Status: DISCONTINUED | OUTPATIENT
Start: 2024-12-05 | End: 2024-12-05

## 2024-12-05 RX ORDER — LIDOCAINE HYDROCHLORIDE AND EPINEPHRINE 15; 5 MG/ML; UG/ML
INJECTION, SOLUTION EPIDURAL
Status: DISCONTINUED | OUTPATIENT
Start: 2024-12-05 | End: 2024-12-05

## 2024-12-05 RX ORDER — SODIUM CHLORIDE 0.9 % (FLUSH) 0.9 %
10 SYRINGE (ML) INJECTION
Status: DISCONTINUED | OUTPATIENT
Start: 2024-12-05 | End: 2024-12-06 | Stop reason: HOSPADM

## 2024-12-05 RX ORDER — DIPHENHYDRAMINE HCL 25 MG
25 CAPSULE ORAL EVERY 4 HOURS PRN
Status: DISCONTINUED | OUTPATIENT
Start: 2024-12-05 | End: 2024-12-06 | Stop reason: HOSPADM

## 2024-12-05 RX ORDER — PRENATAL WITH FERROUS FUM AND FOLIC ACID 3080; 920; 120; 400; 22; 1.84; 3; 20; 10; 1; 12; 200; 27; 25; 2 [IU]/1; [IU]/1; MG/1; [IU]/1; MG/1; MG/1; MG/1; MG/1; MG/1; MG/1; UG/1; MG/1; MG/1; MG/1; MG/1
1 TABLET ORAL DAILY
Status: DISCONTINUED | OUTPATIENT
Start: 2024-12-05 | End: 2024-12-06 | Stop reason: HOSPADM

## 2024-12-05 RX ORDER — ONDANSETRON 4 MG/1
8 TABLET, ORALLY DISINTEGRATING ORAL EVERY 8 HOURS PRN
Status: DISCONTINUED | OUTPATIENT
Start: 2024-12-05 | End: 2024-12-06 | Stop reason: HOSPADM

## 2024-12-05 RX ORDER — DIPHENHYDRAMINE HCL 25 MG
25 CAPSULE ORAL EVERY 6 HOURS PRN
Status: COMPLETED | OUTPATIENT
Start: 2024-12-05 | End: 2024-12-05

## 2024-12-05 RX ORDER — DIPHENHYDRAMINE HCL 25 MG
25 CAPSULE ORAL EVERY 6 HOURS PRN
Status: DISCONTINUED | OUTPATIENT
Start: 2024-12-05 | End: 2024-12-05

## 2024-12-05 RX ORDER — OXYCODONE AND ACETAMINOPHEN 5; 325 MG/1; MG/1
1 TABLET ORAL EVERY 4 HOURS PRN
Status: DISCONTINUED | OUTPATIENT
Start: 2024-12-05 | End: 2024-12-06 | Stop reason: HOSPADM

## 2024-12-05 RX ORDER — OXYCODONE AND ACETAMINOPHEN 10; 325 MG/1; MG/1
1 TABLET ORAL EVERY 4 HOURS PRN
Status: DISCONTINUED | OUTPATIENT
Start: 2024-12-05 | End: 2024-12-06 | Stop reason: HOSPADM

## 2024-12-05 RX ORDER — OXYTOCIN-SODIUM CHLORIDE 0.9% IV SOLN 30 UNIT/500ML 30-0.9/5 UT/ML-%
95 SOLUTION INTRAVENOUS ONCE AS NEEDED
Status: DISCONTINUED | OUTPATIENT
Start: 2024-12-05 | End: 2024-12-06 | Stop reason: HOSPADM

## 2024-12-05 RX ORDER — METHYLERGONOVINE MALEATE 0.2 MG/ML
200 INJECTION INTRAVENOUS ONCE AS NEEDED
Status: DISCONTINUED | OUTPATIENT
Start: 2024-12-05 | End: 2024-12-06 | Stop reason: HOSPADM

## 2024-12-05 RX ORDER — SODIUM CITRATE AND CITRIC ACID MONOHYDRATE 334; 500 MG/5ML; MG/5ML
30 SOLUTION ORAL
Status: DISCONTINUED | OUTPATIENT
Start: 2024-12-05 | End: 2024-12-06 | Stop reason: HOSPADM

## 2024-12-05 RX ORDER — DIPHENHYDRAMINE HYDROCHLORIDE 50 MG/ML
25 INJECTION INTRAMUSCULAR; INTRAVENOUS EVERY 4 HOURS PRN
Status: DISCONTINUED | OUTPATIENT
Start: 2024-12-05 | End: 2024-12-06 | Stop reason: HOSPADM

## 2024-12-05 RX ORDER — OXYTOCIN-SODIUM CHLORIDE 0.9% IV SOLN 30 UNIT/500ML 30-0.9/5 UT/ML-%
95 SOLUTION INTRAVENOUS CONTINUOUS PRN
Status: DISCONTINUED | OUTPATIENT
Start: 2024-12-05 | End: 2024-12-06 | Stop reason: HOSPADM

## 2024-12-05 RX ORDER — HYDROCORTISONE 25 MG/G
CREAM TOPICAL 3 TIMES DAILY PRN
Status: DISCONTINUED | OUTPATIENT
Start: 2024-12-05 | End: 2024-12-06 | Stop reason: HOSPADM

## 2024-12-05 RX ORDER — FAMOTIDINE 10 MG/ML
20 INJECTION INTRAVENOUS
Status: DISCONTINUED | OUTPATIENT
Start: 2024-12-05 | End: 2024-12-06 | Stop reason: HOSPADM

## 2024-12-05 RX ORDER — EPHEDRINE SULFATE 50 MG/ML
10 INJECTION, SOLUTION INTRAVENOUS EVERY 5 MIN PRN
Status: DISCONTINUED | OUTPATIENT
Start: 2024-12-05 | End: 2024-12-05

## 2024-12-05 RX ORDER — DIPHENHYDRAMINE HYDROCHLORIDE 50 MG/ML
12.5 INJECTION INTRAMUSCULAR; INTRAVENOUS EVERY 4 HOURS PRN
Status: DISCONTINUED | OUTPATIENT
Start: 2024-12-05 | End: 2024-12-06 | Stop reason: HOSPADM

## 2024-12-05 RX ORDER — ACETAMINOPHEN 325 MG/1
650 TABLET ORAL EVERY 6 HOURS SCHEDULED
Status: DISCONTINUED | OUTPATIENT
Start: 2024-12-05 | End: 2024-12-06 | Stop reason: HOSPADM

## 2024-12-05 RX ORDER — ONDANSETRON HYDROCHLORIDE 2 MG/ML
4 INJECTION, SOLUTION INTRAVENOUS EVERY 4 HOURS PRN
Status: DISCONTINUED | OUTPATIENT
Start: 2024-12-05 | End: 2024-12-05

## 2024-12-05 RX ORDER — OXYTOCIN-SODIUM CHLORIDE 0.9% IV SOLN 30 UNIT/500ML 30-0.9/5 UT/ML-%
20 SOLUTION INTRAVENOUS ONCE AS NEEDED
Status: DISCONTINUED | OUTPATIENT
Start: 2024-12-05 | End: 2024-12-06 | Stop reason: HOSPADM

## 2024-12-05 RX ORDER — FENTANYL/BUPIVACAINE/NS/PF 2-1250MCG
PLASTIC BAG, INJECTION (ML) INJECTION CONTINUOUS PRN
Status: DISCONTINUED | OUTPATIENT
Start: 2024-12-05 | End: 2024-12-05

## 2024-12-05 RX ORDER — DOCUSATE SODIUM 100 MG/1
200 CAPSULE, LIQUID FILLED ORAL 2 TIMES DAILY PRN
Status: DISCONTINUED | OUTPATIENT
Start: 2024-12-05 | End: 2024-12-06 | Stop reason: HOSPADM

## 2024-12-05 RX ORDER — DIPHENOXYLATE HYDROCHLORIDE AND ATROPINE SULFATE 2.5; .025 MG/1; MG/1
2 TABLET ORAL EVERY 6 HOURS PRN
Status: DISCONTINUED | OUTPATIENT
Start: 2024-12-05 | End: 2024-12-06 | Stop reason: HOSPADM

## 2024-12-05 RX ORDER — IBUPROFEN 600 MG/1
600 TABLET ORAL EVERY 6 HOURS PRN
Status: DISCONTINUED | OUTPATIENT
Start: 2024-12-05 | End: 2024-12-06 | Stop reason: HOSPADM

## 2024-12-05 RX ORDER — OXYTOCIN-SODIUM CHLORIDE 0.9% IV SOLN 30 UNIT/500ML 30-0.9/5 UT/ML-%
0-32 SOLUTION INTRAVENOUS CONTINUOUS
Status: DISCONTINUED | OUTPATIENT
Start: 2024-12-05 | End: 2024-12-05

## 2024-12-05 RX ORDER — EPHEDRINE SULFATE 50 MG/ML
25 INJECTION, SOLUTION INTRAVENOUS EVERY 30 MIN PRN
Status: DISCONTINUED | OUTPATIENT
Start: 2024-12-05 | End: 2024-12-05

## 2024-12-05 RX ORDER — CARBOPROST TROMETHAMINE 250 UG/ML
250 INJECTION, SOLUTION INTRAMUSCULAR
Status: DISCONTINUED | OUTPATIENT
Start: 2024-12-05 | End: 2024-12-06 | Stop reason: HOSPADM

## 2024-12-05 RX ADMIN — LIDOCAINE HYDROCHLORIDE,EPINEPHRINE BITARTRATE 3 ML: 15; .005 INJECTION, SOLUTION EPIDURAL; INFILTRATION; INTRACAUDAL; PERINEURAL at 05:12

## 2024-12-05 RX ADMIN — SODIUM CHLORIDE, POTASSIUM CHLORIDE, SODIUM LACTATE AND CALCIUM CHLORIDE 50 ML: 600; 310; 30; 20 INJECTION, SOLUTION INTRAVENOUS at 11:12

## 2024-12-05 RX ADMIN — CALCIUM CARBONATE (ANTACID) CHEW TAB 500 MG 500 MG: 500 CHEW TAB at 11:12

## 2024-12-05 RX ADMIN — IBUPROFEN 600 MG: 600 TABLET, FILM COATED ORAL at 02:12

## 2024-12-05 RX ADMIN — OXYTOCIN-SODIUM CHLORIDE 0.9% IV SOLN 30 UNIT/500ML 10 UNITS: 30-0.9/5 SOLUTION at 12:12

## 2024-12-05 RX ADMIN — DIPHENHYDRAMINE HYDROCHLORIDE 25 MG: 25 CAPSULE ORAL at 01:12

## 2024-12-05 RX ADMIN — SODIUM CHLORIDE, POTASSIUM CHLORIDE, SODIUM LACTATE AND CALCIUM CHLORIDE 1000 ML: 600; 310; 30; 20 INJECTION, SOLUTION INTRAVENOUS at 04:12

## 2024-12-05 RX ADMIN — PRENATAL VITAMINS-IRON FUMARATE 27 MG IRON-FOLIC ACID 0.8 MG TABLET 1 TABLET: at 02:12

## 2024-12-05 RX ADMIN — DIPHENHYDRAMINE HYDROCHLORIDE 25 MG: 25 CAPSULE ORAL at 08:12

## 2024-12-05 RX ADMIN — FENTANYL CITRATE 15 MCG: 50 INJECTION, SOLUTION INTRAMUSCULAR; INTRAVENOUS at 05:12

## 2024-12-05 RX ADMIN — SODIUM CHLORIDE, POTASSIUM CHLORIDE, SODIUM LACTATE AND CALCIUM CHLORIDE 500 ML: 600; 310; 30; 20 INJECTION, SOLUTION INTRAVENOUS at 07:12

## 2024-12-05 RX ADMIN — IBUPROFEN 600 MG: 600 TABLET, FILM COATED ORAL at 07:12

## 2024-12-05 RX ADMIN — Medication 2 MILLI-UNITS/MIN: at 04:12

## 2024-12-05 RX ADMIN — Medication 12 ML/HR: at 11:12

## 2024-12-05 RX ADMIN — EPHEDRINE SULFATE 10 MG: 50 INJECTION INTRAVENOUS at 07:12

## 2024-12-05 RX ADMIN — BUTORPHANOL TARTRATE 1 MG: 2 INJECTION, SOLUTION INTRAMUSCULAR; INTRAVENOUS at 01:12

## 2024-12-05 RX ADMIN — CALCIUM CARBONATE (ANTACID) CHEW TAB 500 MG 500 MG: 500 CHEW TAB at 12:12

## 2024-12-05 RX ADMIN — Medication: at 02:12

## 2024-12-05 RX ADMIN — Medication 12 ML/HR: at 05:12

## 2024-12-05 NOTE — ANESTHESIA PROCEDURE NOTES
CSE    Patient location during procedure: OB  Start time: 12/5/2024 5:00 AM  Timeout: 12/5/2024 4:57 AM  End time: 12/5/2024 5:07 AM    Reason for block: labor analgesia requested by patient and obstetrician    Staffing  Authorizing Provider: Phil Malone MD  Performing Provider: Chevy Mcgill CRNA    Staffing  Performed by: Chevy Mcgill CRNA  Authorized by: Phil Malone MD    Preanesthetic Checklist  Completed: patient identified, IV checked, site marked, risks and benefits discussed, surgical consent, monitors and equipment checked, pre-op evaluation and timeout performed  CSE  Patient position: sitting  Prep: ChloraPrep and site prepped and draped  Patient monitoring: heart rate, continuous pulse ox and frequent blood pressure checks  Approach: midline  Spinal Needle  Needle type: pencil-tip   Needle gauge: 25 G  Needle length: 5 in  Epidural Needle  Injection technique: AJAY saline  Needle type: Tuohy   Needle gauge: 17 G  Needle length: 3.5 in  Needle insertion depth: 5 cm  Location: L3-4  Needle localization: anatomical landmarks   Catheter  Catheter type: inEarth  Catheter size: 19 G  Catheter at skin depth: 11 cm  Test dose: lidocaine 1.5% with Epi 1-to-200,000  Test dose: 3 mL  Additional Documentation: incremental injection, negative aspiration for CSF, negative aspiration for heme, no paresthesia on injection and negative test dose

## 2024-12-05 NOTE — L&D DELIVERY NOTE
[facility] - Labor and Delivery  Vaginal Delivery   Operative Note    SUMMARY     Normal spontaneous vaginal delivery of live infant, was placed on mothers abdomen for skin to skin and bulb suctioning performed.  Infant delivered position GREG over perineum.  Nuchal cord: Yes, cord reduced at perineum.    Spontaneous delivery of placenta and IV pitocin given noting good uterine tone.  2nd degree laceration noted and repaired in normal fashion with 2-0 chromic .  Patient tolerated delivery well. Sponge needle and lap counted correctly x2.    Indications: <principal problem not specified>  Pregnancy complicated by:   Patient Active Problem List   Diagnosis    Missed     Recurrent pregnancy loss    Postsurgical hypothyroidism    First trimester pregnancy    - Hypothyroidism affecting pregnancy in third trimester    39 weeks gestation of pregnancy     Admitting GA: 40w0d    Delivery Information for Aurora Tamayo    Birth information:  YOB: 2022   Time of birth: 12:53 PM   Sex: female   Head Delivery Date/Time:     Delivery type:    Gestational Age: 40w0d       Delivery Providers    Delivering clinician:            Measurements    Weight:   Length:          Apgars    Living status:   Apgar Component Scores:  1 min.:  5 min.:  10 min.:  15 min.:  20 min.:    Skin color:         Heart rate:         Reflex irritability:         Muscle tone:         Respiratory effort:         Total:                                  Interventions/Resuscitation           Cord    No data filed       Placenta    Placenta delivery date/time:   Placenta removal:            Labor Events:       labor:       Labor Onset Date/Time:         Dilation Complete Date/Time: 2024 12:40     Start Pushing Date/Time: 2024 12:49     Rupture Date/Time: 24 0335        Rupture type: SRM (Spontaneous Rupture)        Fluid Amount:       Fluid Color: Clear      steroids:       Antibiotics  given for GBS:       Induction:       Indications for induction:        Augmentation:       Indications for augmentation:       Labor complications:       Additional complications:          Cervical ripening:                     Delivery:      Episiotomy: None     Indication for Episiotomy:       Perineal Lacerations: 2nd Repaired:  Yes   Periurethral Laceration:   Repaired:     Labial Laceration:   Repaired:     Sulcus Laceration:   Repaired:     Vaginal Laceration:   Repaired:     Cervical Laceration:   Repaired:     Repair suture:       Repair # of packets: 1     Last Value - EBL - Nursing (mL):       Sum - EBL - Nursing (mL): 0     Last Value - EBL - Anesthesia (mL):      Calculated QBL (mL):       Running total QBL (mL):       Vaginal Sweep Performed:       Surgicount Correct:         Other providers:            Details (if applicable):  Trial of Labor      Categorization:      Priority:     Indications for :     Incision Type:       Additional  information:  Forceps:    Vacuum:    Breech:    Observed anomalies    Other (Comments):

## 2024-12-05 NOTE — PROGRESS NOTES
12/05/24 0451   Vital Signs   Pulse 99   SpO2 99 %   Uterine Activity (PeriWATCH)   Monitor Mode External [place]   Contraction Frequency (min)   (unable to determine)   Uterine Tone (PeriWATCH)   Resting Tone (palp) Soft   Fetal Assessment (PeriWATCH) Baby A   Fetal Monitor Mode Ultrasound   Baseline    Variability Moderate   Accels Present   Decels None   Rhythm Regular   FA Characteristics Normal     EFM and toco removed from soft, nontender abdomen for placement of ALLAN

## 2024-12-05 NOTE — PLAN OF CARE
Problem: Adult Inpatient Plan of Care  Goal: Plan of Care Review  2024 by Pamela Bales RN  Outcome: Progressing  2024 by Pamela Bales RN  Outcome: Progressing  Goal: Patient-Specific Goal (Individualized)  2024 by Pamela Bales, RN  Outcome: Progressing  2024 by Pamela Bales, RN  Outcome: Progressing  Goal: Absence of Hospital-Acquired Illness or Injury  2024 by Pamela Bales, RN  Outcome: Progressing  2024 by Pamela Bales, RN  Outcome: Progressing  Goal: Optimal Comfort and Wellbeing  2024 by Pamela Bales RN  Outcome: Progressing  2024 by Pamela Bales, RN  Outcome: Progressing  Goal: Readiness for Transition of Care  2024 by Pamela Bales, RN  Outcome: Progressing  2024 by Pamela Bales RN  Outcome: Progressing     Problem: Infection  Goal: Absence of Infection Signs and Symptoms  2024 by Pamela Bales RN  Outcome: Progressing  2024 by Pamela Bales RN  Outcome: Progressing     Problem:  Fall Injury Risk  Goal: Absence of Fall, Infant Drop and Related Injury  2024 by Pamela Bales, RN  Outcome: Progressing  2024 by Pamela Bales, RN  Outcome: Progressing     Problem: Labor  Goal: Hemostasis  2024 by Pamela Bales, RN  Outcome: Progressing  2024 by Pamela Bales, RN  Outcome: Progressing  Goal: Stable Fetal Wellbeing  2024 by Pamela Bales, RN  Outcome: Progressing  2024 by Pamela Bales, RN  Outcome: Progressing  Goal: Effective Progression to Delivery  2024 by Pamela Bales, RN  Outcome: Progressing  2024 by Pamela Bales, RN  Outcome: Progressing  Goal: Absence of Infection Signs and Symptoms  20244 by  Pamela Bales RN  Outcome: Progressing  12/4/2024 2144 by Pamela Bales RN  Outcome: Progressing  Goal: Acceptable Pain Control  12/4/2024 2144 by Pamlea Bales RN  Outcome: Progressing  12/4/2024 2144 by Pamela Bales RN  Outcome: Progressing  Goal: Normal Uterine Contraction Pattern  12/4/2024 2144 by Pamela Bales RN  Outcome: Progressing  12/4/2024 2144 by Pamela Bales RN  Outcome: Progressing     Problem: Pain Acute  Goal: Optimal Pain Control and Function  12/4/2024 2144 by Pamela Bales RN  Outcome: Progressing  12/4/2024 2144 by Pamela Bales RN  Outcome: Progressing

## 2024-12-05 NOTE — PROGRESS NOTES
Pt assisted up to the bathroom after delivery. Unable to void, reva care performed and pads applied, unsteady gait noted, assistance needed.

## 2024-12-05 NOTE — H&P
HISTORY AND PHYSICAL                                                OBSTETRICS          Subjective:      Carina Tamayo is a 29 y.o.  female with IUP at 40w0d weeks gestation who presents to L&D for IOL. Pertinent medical history for this pregnancy includes thyroid disease.  Care this pregnancy has been with Dr. العراقي    PMHx:   Past Medical History:   Diagnosis Date    Disorder of thyroid, unspecified     Missed         PSHx:   Past Surgical History:   Procedure Laterality Date    DILATION AND CURETTAGE OF UTERUS USING SUCTION N/A 3/28/2023    Procedure: DILATION AND CURETTAGE, UTERUS, USING SUCTION;  Surgeon: Rebekah العراقي MD;  Location: [facility] OR;  Service: OB/GYN;  Laterality: N/A;    HYSTEROSCOPY WITH DILATION AND CURETTAGE OF UTERUS N/A 2023    Procedure: HYSTEROSCOPY, WITH DILATION AND CURETTAGE OF UTERUS;  Surgeon: Rebekah العراقي MD;  Location: [facility] OR;  Service: OB/GYN;  Laterality: N/A;    THYROIDECTOMY      TUMOR EXCISION      removed from thyroid       All: Review of patient's allergies indicates:  No Known Allergies    Meds:   Medications Prior to Admission   Medication Sig Dispense Refill Last Dose/Taking    ferrous sulfate, dried (SLOW FE) 160 mg (50 mg iron) TbSR Take 160 mg by mouth once daily.   12/3/2024    levothyroxine (SYNTHROID, LEVOTHROID) 175 MCG tablet Take 1 tablet (175 mcg total) by mouth once daily. Except on Sundays take no pill 30 tablet 6 2024    ACCRUFER 30 mg Cap Take 1 capsule by mouth 2 (two) times daily. (Patient not taking: Reported on 2024)   More than a month       SH:   Social History     Socioeconomic History    Marital status:      Spouse name: Glenn   Occupational History    Occupation: teacher   Tobacco Use    Smoking status: Never    Smokeless tobacco: Never   Substance and Sexual Activity    Alcohol use: Not Currently    Drug use: Never    Sexual activity: Not Currently     Partners: Male     Birth control/protection: None  "    Social Drivers of Health     Financial Resource Strain: Low Risk  (2024)    Overall Financial Resource Strain (CARDIA)     Difficulty of Paying Living Expenses: Not hard at all   Food Insecurity: No Food Insecurity (2024)    Hunger Vital Sign     Worried About Running Out of Food in the Last Year: Never true     Ran Out of Food in the Last Year: Never true   Transportation Needs: No Transportation Needs (2024)    TRANSPORTATION NEEDS     Transportation : No   Stress: No Stress Concern Present (2024)    [employer] - Occupational Stress Questionnaire     Feeling of Stress : Not at all   Housing Stability: Low Risk  (2024)    Housing Stability Vital Sign     Unable to Pay for Housing in the Last Year: No     Homeless in the Last Year: No       FH:   Family History   Problem Relation Name Age of Onset    No Known Problems Mother      No Known Problems Father         OBHx:   OB History    Para Term  AB Living   3 0 0 0 2 0   SAB IAB Ectopic Multiple Live Births   2 0 0 0 0      # Outcome Date GA Lbr Jayy/2nd Weight Sex Type Anes PTL Lv   3 Current            2 SAB 2023 7w0d    SAB         Birth Comments: D&C pt reports blood clot on the uterus   1 2023 7w0d    SAB         Birth Comments: D&C       Objective:      BP (!) 109/55   Pulse 88   Temp 98 °F (36.7 °C)   Resp 18   Ht 5' 3" (1.6 m)   Wt 77.1 kg (170 lb)   LMP 2023 (Approximate) Comment: missed AB  SpO2 100%   Breastfeeding No   BMI 30.11 kg/m²   Body mass index is 30.11 kg/m².    General:   alert and cooperative   HEENT:  normocephalic, atraumatic   Lungs:   clear to auscultation bilaterally   Heart:   regular rate and rhythm, S1, S2 normal   Abdomen:  gravid, non-tender   Extremities non-tender, no edema   Derm: no rashes or lesions   Psych: appropriate mood and affect   Pelvis:  adequate       FHT: Category: 2, overall reassuring                 TOCO: Contractions: " regular, every 2-4 minutes       Cervix:     Dilation: 2 cm    Effacement: 60    Station:  -3    Consistency: soft    Position anterior       Lab Review  GBS: negative   9.6     Assessment:     29 y.o.  at 40w0d weeks gestation for IOL     Plan:     1. Risks, benefits, alternatives and possible complications have been discussed in detail with the patient. All questions have been answered, and Ms. Tamayo has voiced understanding and agrees to the treatment plan.  2. Consents signed and in chart  3. Admit to Labor and Delivery unit  4. S/p Cervidil and SROM.  Epidural in place.  Pitocin. All questions answered.

## 2024-12-05 NOTE — ANESTHESIA POSTPROCEDURE EVALUATION
Anesthesia Post Evaluation    Patient: Carina Tamayo    Procedure(s) Performed: * No procedures listed *    Final Anesthesia Type: epidural      Patient location during evaluation: labor & delivery  Patient participation: Yes- Able to Participate  Level of consciousness: awake and alert  Post-procedure vital signs: reviewed and stable  Pain management: adequate  Airway patency: patent  EROS mitigation strategies: Multimodal analgesia  PONV status at discharge: No PONV  Anesthetic complications: no      Cardiovascular status: blood pressure returned to baseline and hemodynamically stable  Respiratory status: unassisted and spontaneous ventilation  Hydration status: euvolemic  Follow-up not needed.          Vitals Value Taken Time   /66 12/05/24 1501   Temp 37.7 °C (99.8 °F) 12/05/24 1321   Pulse 100 12/05/24 1501   Resp 18 12/04/24 2031   SpO2 100 % 12/05/24 1256   Vitals shown include unfiled device data.      No case tracking events are documented in the log.      Pain/Rachele Score: Pain Rating Prior to Med Admin: 1 (12/5/2024  2:33 PM)

## 2024-12-05 NOTE — ANESTHESIA PREPROCEDURE EVALUATION
[facility] - Labor and Delivery  Anesthesiology  Labor Epidural    Patient Name: Carina Tamayo  MRN: [id number]  Admission Date: 2024  Primary Care Provider: Rin Zavala MD  Attending Physician: Jose Del Rio MD    Subjective:     Patient in labor, requesting epidural.    OB History    Para Term  AB Living   3 0 0 0 2 0   SAB IAB Ectopic Multiple Live Births   2 0 0 0 0      # Outcome Date GA Lbr Jayy/2nd Weight Sex Type Anes PTL Lv   3 Current            2 2023 7w0d    SAB         Birth Comments: D&C pt reports blood clot on the uterus   1 2023 7w0d    SAB         Birth Comments: D&C     Past Medical History:   Diagnosis Date    Disorder of thyroid, unspecified     Missed       Past Surgical History:   Procedure Laterality Date    DILATION AND CURETTAGE OF UTERUS USING SUCTION N/A 3/28/2023    Procedure: DILATION AND CURETTAGE, UTERUS, USING SUCTION;  Surgeon: Rebekah العراقي MD;  Location: [facility] OR;  Service: OB/GYN;  Laterality: N/A;    HYSTEROSCOPY WITH DILATION AND CURETTAGE OF UTERUS N/A 2023    Procedure: HYSTEROSCOPY, WITH DILATION AND CURETTAGE OF UTERUS;  Surgeon: Rebekah العراقي MD;  Location: [facility] OR;  Service: OB/GYN;  Laterality: N/A;    THYROIDECTOMY      TUMOR EXCISION      removed from thyroid       PTA Medications   Medication Sig    ferrous sulfate, dried (SLOW FE) 160 mg (50 mg iron) TbSR Take 160 mg by mouth once daily.    levothyroxine (SYNTHROID, LEVOTHROID) 175 MCG tablet Take 1 tablet (175 mcg total) by mouth once daily. Except on Sundays take no pill    ACCRUFER 30 mg Cap Take 1 capsule by mouth 2 (two) times daily. (Patient not taking: Reported on 2024)       Review of patient's allergies indicates:  No Known Allergies     Tobacco Use    Smoking status: Never    Smokeless tobacco: Never   Substance and Sexual Activity    Alcohol use: Not Currently    Drug use: Never    Sexual activity: Not Currently     Partners:  Male     Birth control/protection: None     Review of Systems   Objective:     Vital Signs (Most Recent):  Temp: 36.7 °C (98 °F) (24)  Pulse: 90 (24)  Resp: 18 (24)  BP: 123/64 (24)  SpO2: 98 % (24) Vital Signs (24h Range):  Temp:  [36.6 °C (97.9 °F)-36.7 °C (98 °F)] 36.7 °C (98 °F)  Pulse:  [] 90  Resp:  [18] 18  SpO2:  [97 %-100 %] 98 %  BP: (101-126)/(64-78) 123/64     Weight: 77.1 kg (170 lb)  Body mass index is 30.11 kg/m².    Significant Labs:  Lab Results   Component Value Date    WBC 7.27 2024    HGB 9.6 (L) 2024    HCT 30.0 (L) 2024    MCV 85.5 2024     2024       Assessment/Plan:     29 y.o. female  at 40w0d for: Labor epidural    Chevy Mcgill, KYLEE  Anesthesiology  [facility] - Labor and Delivery         Pre-op Assessment    I have reviewed the Patient Summary Reports.     I have reviewed the Nursing Notes. I have reviewed the NPO Status.   I have reviewed the Medications.     Review of Systems  Anesthesia Hx:             Denies Family Hx of Anesthesia complications.    Denies Personal Hx of Anesthesia complications.                    Hematology/Oncology:  Hematology Normal   Oncology Normal                                   EENT/Dental:  EENT/Dental Normal           Cardiovascular:  Cardiovascular Normal                                              Pulmonary:  Pulmonary Normal                       Renal/:  Renal/ Normal                 Hepatic/GI:  Hepatic/GI Normal                    Musculoskeletal:  Musculoskeletal Normal                Neurological:  Neurology Normal                                      Endocrine:   Hypothyroidism  Thyroidectomy        Dermatological:  Skin Normal    Psych:  Psychiatric Normal                    Physical Exam  General: Well nourished, Cooperative, Oriented and Alert    Airway:  Mallampati: II   Mouth Opening: Normal  TM Distance:  Normal  Tongue: Normal  Neck ROM: Normal ROM        Anesthesia Plan  Type of Anesthesia, risks & benefits discussed:    Anesthesia Type: Epidural, CSE  Informed Consent: Informed consent signed with the Patient and all parties understand the risks and agree with anesthesia plan.  All questions answered.   ASA Score: 2  Day of Surgery Review of History & Physical: H&P Update referred to the surgeon/provider.    Ready For Surgery From Anesthesia Perspective.     .

## 2024-12-05 NOTE — PLAN OF CARE
Problem: Adult Inpatient Plan of Care  Goal: Plan of Care Review  Outcome: Progressing  Goal: Patient-Specific Goal (Individualized)  Outcome: Progressing  Goal: Absence of Hospital-Acquired Illness or Injury  Outcome: Progressing  Goal: Optimal Comfort and Wellbeing  Outcome: Progressing  Goal: Readiness for Transition of Care  Outcome: Progressing     Problem: Infection  Goal: Absence of Infection Signs and Symptoms  Outcome: Progressing     Problem:  Fall Injury Risk  Goal: Absence of Fall, Infant Drop and Related Injury  Outcome: Progressing     Problem: Labor  Goal: Hemostasis  Outcome: Progressing  Goal: Stable Fetal Wellbeing  Outcome: Progressing  Goal: Effective Progression to Delivery  Outcome: Progressing  Goal: Absence of Infection Signs and Symptoms  Outcome: Progressing  Goal: Acceptable Pain Control  Outcome: Progressing  Goal: Normal Uterine Contraction Pattern  Outcome: Progressing     Problem: Pain Acute  Goal: Optimal Pain Control and Function  Outcome: Progressing

## 2024-12-05 NOTE — PROGRESS NOTES
12/05/24 0515   Uterine Activity (PeriWATCH)   Monitor Mode External [place]   Uterine Tone (PeriWATCH)   Resting Tone (palp) Soft   Fetal Assessment (PeriWATCH) Baby A   Fetal Monitor Mode Ultrasound   Baseline      EFM and toco reapplied to soft non tender abdomen after placement of ALLAN

## 2024-12-06 PROCEDURE — 25000003 PHARM REV CODE 250: Performed by: OBSTETRICS & GYNECOLOGY

## 2024-12-06 RX ADMIN — PRENATAL VITAMINS-IRON FUMARATE 27 MG IRON-FOLIC ACID 0.8 MG TABLET 1 TABLET: at 08:12

## 2024-12-06 RX ADMIN — IBUPROFEN 600 MG: 600 TABLET, FILM COATED ORAL at 08:12

## 2024-12-06 RX ADMIN — IBUPROFEN 600 MG: 600 TABLET, FILM COATED ORAL at 02:12

## 2024-12-06 RX ADMIN — LEVOTHYROXINE SODIUM 125 MCG: 25 TABLET ORAL at 08:12

## 2024-12-06 RX ADMIN — DOCUSATE SODIUM 200 MG: 100 CAPSULE, LIQUID FILLED ORAL at 08:12

## 2024-12-06 NOTE — PROGRESS NOTES
PostPartum Progress Note        Subjective:      Post-Partum Day #1 after uncomplicated vaginal delivery.    Patient is without complaints. Lochia decreasing. Breast feeding. Pain is well controlled. Patient is ambulating. Tolerating Full Regular diet.Overall mother and baby are doing well.     Objective:      Temp:  [97.8 °F (36.6 °C)-99.8 °F (37.7 °C)] 98.3 °F (36.8 °C)  Pulse:  [] 81  Resp:  [18] 18  SpO2:  [96 %-100 %] 96 %  BP: ()/(46-85) 108/71    Intake/Output Summary (Last 24 hours) at 2024 0916  Last data filed at 2024 1553  Gross per 24 hour   Intake 728.06 ml   Output 1600 ml   Net -871.94 ml     Body mass index is 30.11 kg/m².    General: no acute distress  Abdomen: soft, non-tender, non-distended; Fundus firm and below the umbilicus     Extremities: non-tender, symmetric, trace edema    Group & Rh   Date Value Ref Range Status   2024 B POS  Final     Recent Results (from the past 2 weeks)   CBC with Differential    Collection Time: 24  8:37 PM   Result Value Ref Range    WBC 7.27 4.50 - 11.50 x10(3)/mcL    Hgb 9.6 (L) 12.0 - 16.0 g/dL    Hct 30.0 (L) 37.0 - 47.0 %    Platelet 173 130 - 400 x10(3)/mcL          Assessment:     29 y.o.  S/P  Post-Partum Day #1  - Doing Well      Plan:     1. Continue routine postpartum care  2. Plan for D/C today

## 2024-12-06 NOTE — DISCHARGE SUMMARY
"Delivery Discharge Summary  Obstetrics      Primary OB Clinician: Rebekah العراقي MD    Discharge Provider: Kaylie Mesa NP    Admission date: 2024  Discharge date: 2024    Admit Dx:   Discharge Dx:    Patient Active Problem List   Diagnosis    Missed     Recurrent pregnancy loss    Postsurgical hypothyroidism    First trimester pregnancy    - Hypothyroidism affecting pregnancy in third trimester    39 weeks gestation of pregnancy    Vaginal delivery       Procedure:     Hospital Course:  Carina Tamayo is a 29 y.o. now  who was admitted on 2024 for delivery. Patient delivered a viable . Please see delivery note for further details. Pt was in stable condition post delivery and was transferred to the Mother-Baby Unit. Her postpartum course was uncomplicated. On the date of discharge, patient's pain is controlled with oral pain medications. She is tolerating ambulation without SOB or CP, and PO diet without N/V. Reported lochia is within the normal range. Pt in stable condition and ready for discharge.     Pertinent studies:  Postpartum CBC  Lab Results   Component Value Date    WBC 7.27 2024    HGB 9.6 (L) 2024    HCT 30.0 (L) 2024    MCV 85.5 2024     2024       Delivery:    Episiotomy: None   Lacerations: 2nd   Repair suture:     Repair # of packets: 1   Blood loss (ml):       Birth information:  YOB: 2022   Time of birth: 12:53 PM   Sex: female   Delivery type: Vaginal, Spontaneous   Gestational Age: 40w0d     Measurements    Weight: 2948 g  Weight (lbs): 6 lb 8 oz  Length: 49 cm  Length (in): 19.29"  Head circumference: 30.5 cm         Delivery Clinician: Delivery Providers    Delivering clinician: Rebekah العراقي MD   Provider Role    Ashley Alejo, RN Delivery Nurse    Sneha Vicente RN Registered Nurse    Josie Good RN Registered Nurse             Additional  information:  Forceps:    Vacuum:    Breech:  "   Observed anomalies      Living?:     Apgars    Living status: Living  Apgar Component Scores:  1 min.:  5 min.:  10 min.:  15 min.:  20 min.:    Skin color:  1  1       Heart rate:  2  2       Reflex irritability:  2  2       Muscle tone:  2  2       Respiratory effort:  1  2       Total:  8  9       Apgars assigned by: GLENDY PARKER         Placenta: Delivered:       appearance    Disposition: To home, self care    Follow Up: 6 weeks    Patient Instructions:   1. Call the office for any bleeding >2 pads/hour for >2 hours, temperature >100.4, pain that is uncontrolled with medications, or for any other concerns.  2. Pelvic rest and no tub baths x 6 weeks.  3. No driving while on narcotics.    Current Discharge Medication List        CONTINUE these medications which have NOT CHANGED    Details   levothyroxine (SYNTHROID, LEVOTHROID) 175 MCG tablet Take 1 tablet (175 mcg total) by mouth once daily. Except on Sundays take no pill  Qty: 30 tablet, Refills: 6    Associated Diagnoses: Postsurgical hypothyroidism           STOP taking these medications       ferrous sulfate, dried (SLOW FE) 160 mg (50 mg iron) TbSR Comments:   Reason for Stopping:         ACCRUFER 30 mg Cap Comments:   Reason for Stopping:               Kaylie Mesa

## 2024-12-06 NOTE — PLAN OF CARE
"  Problem: Adult Inpatient Plan of Care  Goal: Plan of Care Review  Outcome: Progressing  Goal: Patient-Specific Goal (Individualized)  Description: "I want to breastfeed my baby"  Outcome: Progressing  Goal: Absence of Hospital-Acquired Illness or Injury  Outcome: Progressing  Goal: Optimal Comfort and Wellbeing  Outcome: Progressing  Goal: Readiness for Transition of Care  Outcome: Progressing     Problem: Infection  Goal: Absence of Infection Signs and Symptoms  Outcome: Progressing     Problem:  Fall Injury Risk  Goal: Absence of Fall, Infant Drop and Related Injury  Outcome: Progressing     Problem: Postpartum (Vaginal Delivery)  Goal: Successful Parent Role Transition  Outcome: Progressing  Goal: Hemostasis  Outcome: Progressing  Goal: Absence of Infection Signs and Symptoms  Outcome: Progressing  Goal: Anesthesia/Sedation Recovery  Outcome: Progressing  Goal: Optimal Pain Control and Function  Outcome: Progressing  Goal: Effective Urinary Elimination  Outcome: Progressing     Problem: Pain Acute  Goal: Optimal Pain Control and Function  Outcome: Progressing     Problem: Breastfeeding  Goal: Effective Breastfeeding  Outcome: Progressing     "

## 2024-12-06 NOTE — NURSING
Discharge education completed. No distress noted. Educated on signs/symptoms to look for, when to call MD/return to the hospital. Educated on proper medication administration following discharge and attending follow up appointment with MD. Spouse at bedside. Verbalized understanding.    normal...

## 2024-12-09 VITALS
WEIGHT: 170 LBS | HEART RATE: 81 BPM | TEMPERATURE: 98 F | OXYGEN SATURATION: 96 % | BODY MASS INDEX: 30.12 KG/M2 | HEIGHT: 63 IN | SYSTOLIC BLOOD PRESSURE: 108 MMHG | DIASTOLIC BLOOD PRESSURE: 71 MMHG | RESPIRATION RATE: 18 BRPM

## 2024-12-26 ENCOUNTER — PATIENT MESSAGE (OUTPATIENT)
Dept: MATERNAL FETAL MEDICINE | Facility: CLINIC | Age: 29
End: 2024-12-26
Payer: COMMERCIAL

## 2025-04-06 ENCOUNTER — PATIENT MESSAGE (OUTPATIENT)
Dept: ENDOCRINOLOGY | Facility: CLINIC | Age: 30
End: 2025-04-06
Payer: COMMERCIAL

## (undated) DEVICE — COVER PROXIMA MAYO STAND

## (undated) DEVICE — DRAPE LAVH SURG 109X109X100IN

## (undated) DEVICE — PAD CURITY MATERNITY PERI

## (undated) DEVICE — SUPPORT ULNA NERVE PROTECTOR

## (undated) DEVICE — HANDPIECE ARGYLE YANKAUER 34FR

## (undated) DEVICE — PAD PREP CUFFED NS 24X48IN

## (undated) DEVICE — TOWEL OR DISP STRL BLUE 4/PK

## (undated) DEVICE — GAUZE VISTEC XR DTECT 16 4X4IN

## (undated) DEVICE — GLOVE PROTEXIS PI SYN SURG 6.0

## (undated) DEVICE — SEE MEDLINE ITEM 154981

## (undated) DEVICE — DRAPE LEGGINGS CUFF 31X48IN

## (undated) DEVICE — HANDLE DEVON RIGID OR LIGHT

## (undated) DEVICE — DRESSING TELFA N ADH 3X8IN

## (undated) DEVICE — TUBE SUCTION MEDI-VAC STERILE

## (undated) DEVICE — VACURETTE 8MM CURVED

## (undated) DEVICE — Device

## (undated) DEVICE — DRAPE UNDER BUTTOCKS SUC PORT

## (undated) DEVICE — GOWN X-LG STERILE BACK

## (undated) DEVICE — GLOVE PROTEXIS HYDROGEL SZ6

## (undated) DEVICE — SOL NACL IRR 1000ML BTL

## (undated) DEVICE — VACURETTE 10MM CURVED

## (undated) DEVICE — SOL NORMAL USPCA 0.9%

## (undated) DEVICE — LUBRICANT SURGILUBE 2 OZ